# Patient Record
Sex: FEMALE | Race: WHITE | NOT HISPANIC OR LATINO | Employment: FULL TIME | URBAN - METROPOLITAN AREA
[De-identification: names, ages, dates, MRNs, and addresses within clinical notes are randomized per-mention and may not be internally consistent; named-entity substitution may affect disease eponyms.]

---

## 2019-01-01 ENCOUNTER — HOSPITAL ENCOUNTER (INPATIENT)
Facility: HOSPITAL | Age: 59
LOS: 4 days | DRG: 871 | End: 2019-05-31
Attending: EMERGENCY MEDICINE | Admitting: INTERNAL MEDICINE
Payer: COMMERCIAL

## 2019-01-01 VITALS
DIASTOLIC BLOOD PRESSURE: 65 MMHG | WEIGHT: 164.25 LBS | HEIGHT: 69 IN | SYSTOLIC BLOOD PRESSURE: 85 MMHG | BODY MASS INDEX: 24.33 KG/M2 | TEMPERATURE: 100 F

## 2019-01-01 DIAGNOSIS — J18.9 PNEUMONIA OF LEFT UPPER LOBE DUE TO INFECTIOUS ORGANISM: Primary | ICD-10-CM

## 2019-01-01 DIAGNOSIS — R65.20 SEVERE SEPSIS WITH ACUTE ORGAN DYSFUNCTION: ICD-10-CM

## 2019-01-01 DIAGNOSIS — C91.40 HAIRY CELL LEUKEMIA NOT HAVING ACHIEVED REMISSION: ICD-10-CM

## 2019-01-01 DIAGNOSIS — E44.0 MODERATE MALNUTRITION: ICD-10-CM

## 2019-01-01 DIAGNOSIS — A41.9 SEVERE SEPSIS WITH ACUTE ORGAN DYSFUNCTION: ICD-10-CM

## 2019-01-01 DIAGNOSIS — J18.9 SEVERE PNEUMONIA: ICD-10-CM

## 2019-01-01 DIAGNOSIS — R57.9 SHOCK CIRCULATORY: ICD-10-CM

## 2019-01-01 DIAGNOSIS — D70.8 OTHER NEUTROPENIA: Chronic | ICD-10-CM

## 2019-01-01 DIAGNOSIS — R00.0 TACHYCARDIA: ICD-10-CM

## 2019-01-01 DIAGNOSIS — Z85.6 HISTORY OF LEUKEMIA: ICD-10-CM

## 2019-01-01 DIAGNOSIS — D61.818 PANCYTOPENIA: ICD-10-CM

## 2019-01-01 DIAGNOSIS — J96.01 ACUTE HYPOXEMIC RESPIRATORY FAILURE: ICD-10-CM

## 2019-01-01 DIAGNOSIS — A41.9 SEPSIS: ICD-10-CM

## 2019-01-01 DIAGNOSIS — J18.9 PNEUMONIA: ICD-10-CM

## 2019-01-01 LAB
ABO + RH BLD: NORMAL
ABO + RH BLD: NORMAL
ALBUMIN SERPL BCP-MCNC: 3 G/DL (ref 3.5–5.2)
ALLENS TEST: ABNORMAL
ALP SERPL-CCNC: 70 U/L (ref 55–135)
ALT SERPL W/O P-5'-P-CCNC: 14 U/L (ref 10–44)
ANION GAP SERPL CALC-SCNC: 10 MMOL/L (ref 8–16)
ANION GAP SERPL CALC-SCNC: 12 MMOL/L (ref 8–16)
ANION GAP SERPL CALC-SCNC: 6 MMOL/L (ref 8–16)
ANION GAP SERPL CALC-SCNC: 8 MMOL/L (ref 8–16)
ANION GAP SERPL CALC-SCNC: 9 MMOL/L (ref 8–16)
ANISOCYTOSIS BLD QL SMEAR: SLIGHT
APTT BLDCRRT: 23.4 SEC (ref 21–32)
AST SERPL-CCNC: 13 U/L (ref 10–40)
BACTERIA #/AREA URNS HPF: ABNORMAL /HPF
BASOPHILS # BLD AUTO: 0 K/UL (ref 0–0.2)
BASOPHILS # BLD AUTO: 0 K/UL (ref 0–0.2)
BASOPHILS # BLD AUTO: 0.01 K/UL (ref 0–0.2)
BASOPHILS # BLD AUTO: ABNORMAL K/UL (ref 0–0.2)
BASOPHILS # BLD AUTO: ABNORMAL K/UL (ref 0–0.2)
BASOPHILS NFR BLD: 0 % (ref 0–1.9)
BASOPHILS NFR BLD: 1.4 % (ref 0–1.9)
BILIRUB SERPL-MCNC: 1.3 MG/DL (ref 0.1–1)
BILIRUB UR QL STRIP: NEGATIVE
BLD GP AB SCN CELLS X3 SERPL QL: NORMAL
BLD GP AB SCN CELLS X3 SERPL QL: NORMAL
BUN SERPL-MCNC: 10 MG/DL (ref 6–20)
BUN SERPL-MCNC: 10 MG/DL (ref 6–20)
BUN SERPL-MCNC: 12 MG/DL (ref 6–20)
BUN SERPL-MCNC: 14 MG/DL (ref 6–20)
BUN SERPL-MCNC: 18 MG/DL (ref 6–20)
BURR CELLS BLD QL SMEAR: ABNORMAL
BURR CELLS BLD QL SMEAR: ABNORMAL
CALCIUM SERPL-MCNC: 5.2 MG/DL (ref 8.7–10.5)
CALCIUM SERPL-MCNC: 7.6 MG/DL (ref 8.7–10.5)
CALCIUM SERPL-MCNC: 8.2 MG/DL (ref 8.7–10.5)
CALCIUM SERPL-MCNC: 8.3 MG/DL (ref 8.7–10.5)
CALCIUM SERPL-MCNC: 8.8 MG/DL (ref 8.7–10.5)
CHLORIDE SERPL-SCNC: 104 MMOL/L (ref 95–110)
CHLORIDE SERPL-SCNC: 106 MMOL/L (ref 95–110)
CHLORIDE SERPL-SCNC: 106 MMOL/L (ref 95–110)
CHLORIDE SERPL-SCNC: 107 MMOL/L (ref 95–110)
CHLORIDE SERPL-SCNC: 115 MMOL/L (ref 95–110)
CLARITY UR: CLEAR
CO2 SERPL-SCNC: 15 MMOL/L (ref 23–29)
CO2 SERPL-SCNC: 22 MMOL/L (ref 23–29)
CO2 SERPL-SCNC: 22 MMOL/L (ref 23–29)
CO2 SERPL-SCNC: 23 MMOL/L (ref 23–29)
CO2 SERPL-SCNC: 25 MMOL/L (ref 23–29)
COLOR UR: YELLOW
CREAT SERPL-MCNC: 0.8 MG/DL (ref 0.5–1.4)
CREAT SERPL-MCNC: 0.8 MG/DL (ref 0.5–1.4)
CREAT SERPL-MCNC: 0.9 MG/DL (ref 0.5–1.4)
CREAT SERPL-MCNC: 0.9 MG/DL (ref 0.5–1.4)
CREAT SERPL-MCNC: 1.3 MG/DL (ref 0.5–1.4)
DACRYOCYTES BLD QL SMEAR: ABNORMAL
DELSYS: ABNORMAL
DIASTOLIC DYSFUNCTION: NO
DIFFERENTIAL METHOD: ABNORMAL
EOSINOPHIL # BLD AUTO: 0 K/UL (ref 0–0.5)
EOSINOPHIL # BLD AUTO: ABNORMAL K/UL (ref 0–0.5)
EOSINOPHIL # BLD AUTO: ABNORMAL K/UL (ref 0–0.5)
EOSINOPHIL NFR BLD: 0 % (ref 0–8)
ERYTHROCYTE [DISTWIDTH] IN BLOOD BY AUTOMATED COUNT: 16.3 % (ref 11.5–14.5)
ERYTHROCYTE [DISTWIDTH] IN BLOOD BY AUTOMATED COUNT: 16.3 % (ref 11.5–14.5)
ERYTHROCYTE [DISTWIDTH] IN BLOOD BY AUTOMATED COUNT: 16.6 % (ref 11.5–14.5)
ERYTHROCYTE [DISTWIDTH] IN BLOOD BY AUTOMATED COUNT: 16.9 % (ref 11.5–14.5)
ERYTHROCYTE [DISTWIDTH] IN BLOOD BY AUTOMATED COUNT: 17.9 % (ref 11.5–14.5)
ERYTHROCYTE [SEDIMENTATION RATE] IN BLOOD BY WESTERGREN METHOD: 14 MM/H
ERYTHROCYTE [SEDIMENTATION RATE] IN BLOOD BY WESTERGREN METHOD: 14 MM/H
ERYTHROCYTE [SEDIMENTATION RATE] IN BLOOD BY WESTERGREN METHOD: 28 MM/H
ERYTHROCYTE [SEDIMENTATION RATE] IN BLOOD BY WESTERGREN METHOD: 28 MM/H
EST. GFR  (AFRICAN AMERICAN): 53 ML/MIN/1.73 M^2
EST. GFR  (AFRICAN AMERICAN): >60 ML/MIN/1.73 M^2
EST. GFR  (NON AFRICAN AMERICAN): 46 ML/MIN/1.73 M^2
EST. GFR  (NON AFRICAN AMERICAN): >60 ML/MIN/1.73 M^2
ESTIMATED PA SYSTOLIC PRESSURE: 24.16
FIO2: 100
FIO2: 40
FIO2: 65
FIO2: 90
FLOW: 10
FLOW: 35
GLUCOSE SERPL-MCNC: 100 MG/DL (ref 70–110)
GLUCOSE SERPL-MCNC: 104 MG/DL (ref 70–110)
GLUCOSE SERPL-MCNC: 110 MG/DL (ref 70–110)
GLUCOSE SERPL-MCNC: 122 MG/DL (ref 70–110)
GLUCOSE SERPL-MCNC: 154 MG/DL (ref 70–110)
GLUCOSE SERPL-MCNC: 154 MG/DL (ref 70–110)
GLUCOSE SERPL-MCNC: 88 MG/DL (ref 70–110)
GLUCOSE UR QL STRIP: NEGATIVE
HCO3 UR-SCNC: 17.4 MMOL/L (ref 24–28)
HCO3 UR-SCNC: 19.2 MMOL/L (ref 24–28)
HCO3 UR-SCNC: 19.4 MMOL/L (ref 24–28)
HCO3 UR-SCNC: 19.6 MMOL/L (ref 24–28)
HCO3 UR-SCNC: 21.5 MMOL/L (ref 24–28)
HCO3 UR-SCNC: 23.7 MMOL/L (ref 24–28)
HCT VFR BLD AUTO: 26 % (ref 37–48.5)
HCT VFR BLD AUTO: 26.3 % (ref 37–48.5)
HCT VFR BLD AUTO: 26.7 % (ref 37–48.5)
HCT VFR BLD AUTO: 33 % (ref 37–48.5)
HCT VFR BLD AUTO: 34.6 % (ref 37–48.5)
HCT VFR BLD CALC: 30 %PCV (ref 36–54)
HCT VFR BLD CALC: 32 %PCV (ref 36–54)
HGB BLD-MCNC: 10.7 G/DL (ref 12–16)
HGB BLD-MCNC: 10.7 G/DL (ref 12–16)
HGB BLD-MCNC: 8.6 G/DL (ref 12–16)
HGB BLD-MCNC: 8.6 G/DL (ref 12–16)
HGB BLD-MCNC: 8.8 G/DL (ref 12–16)
HGB UR QL STRIP: NEGATIVE
HYALINE CASTS #/AREA URNS LPF: 0 /LPF
HYPOCHROMIA BLD QL SMEAR: ABNORMAL
HYPOCHROMIA BLD QL SMEAR: ABNORMAL
INR PPP: 1 (ref 0.8–1.2)
KETONES UR QL STRIP: ABNORMAL
LACTATE SERPL-SCNC: 0.8 MMOL/L (ref 0.5–2.2)
LACTATE SERPL-SCNC: 0.9 MMOL/L (ref 0.5–2.2)
LACTATE SERPL-SCNC: 0.9 MMOL/L (ref 0.5–2.2)
LACTATE SERPL-SCNC: 2.5 MMOL/L (ref 0.5–2.2)
LACTATE SERPL-SCNC: 2.6 MMOL/L (ref 0.5–2.2)
LEUKOCYTE ESTERASE UR QL STRIP: NEGATIVE
LYMPHOCYTES # BLD AUTO: 0.4 K/UL (ref 1–4.8)
LYMPHOCYTES # BLD AUTO: 0.5 K/UL (ref 1–4.8)
LYMPHOCYTES # BLD AUTO: 0.7 K/UL (ref 1–4.8)
LYMPHOCYTES # BLD AUTO: ABNORMAL K/UL (ref 1–4.8)
LYMPHOCYTES # BLD AUTO: ABNORMAL K/UL (ref 1–4.8)
LYMPHOCYTES NFR BLD: 62 % (ref 18–48)
LYMPHOCYTES NFR BLD: 68.9 % (ref 18–48)
LYMPHOCYTES NFR BLD: 70.4 % (ref 18–48)
LYMPHOCYTES NFR BLD: 73.2 % (ref 18–48)
LYMPHOCYTES NFR BLD: 88 % (ref 18–48)
MAGNESIUM SERPL-MCNC: 1.6 MG/DL (ref 1.6–2.6)
MCH RBC QN AUTO: 29.6 PG (ref 27–31)
MCH RBC QN AUTO: 30.1 PG (ref 27–31)
MCH RBC QN AUTO: 30.4 PG (ref 27–31)
MCHC RBC AUTO-ENTMCNC: 30.9 G/DL (ref 32–36)
MCHC RBC AUTO-ENTMCNC: 32.4 G/DL (ref 32–36)
MCHC RBC AUTO-ENTMCNC: 32.7 G/DL (ref 32–36)
MCHC RBC AUTO-ENTMCNC: 33 G/DL (ref 32–36)
MCHC RBC AUTO-ENTMCNC: 33.1 G/DL (ref 32–36)
MCV RBC AUTO: 92 FL (ref 82–98)
MCV RBC AUTO: 94 FL (ref 82–98)
MCV RBC AUTO: 96 FL (ref 82–98)
MICROSCOPIC COMMENT: ABNORMAL
MIN VOL: 12.4
MODE: ABNORMAL
MONOCYTES # BLD AUTO: 0.1 K/UL (ref 0.3–1)
MONOCYTES # BLD AUTO: ABNORMAL K/UL (ref 0.3–1)
MONOCYTES # BLD AUTO: ABNORMAL K/UL (ref 0.3–1)
MONOCYTES NFR BLD: 11.3 % (ref 4–15)
MONOCYTES NFR BLD: 12 % (ref 4–15)
MONOCYTES NFR BLD: 15 % (ref 4–15)
MONOCYTES NFR BLD: 20.4 % (ref 4–15)
MONOCYTES NFR BLD: 7 % (ref 4–15)
NEUTROPHILS # BLD AUTO: 0.1 K/UL (ref 1.8–7.7)
NEUTROPHILS # BLD AUTO: 0.1 K/UL (ref 1.8–7.7)
NEUTROPHILS # BLD AUTO: 0.2 K/UL (ref 1.8–7.7)
NEUTROPHILS NFR BLD: 0 % (ref 38–73)
NEUTROPHILS NFR BLD: 19.8 % (ref 38–73)
NEUTROPHILS NFR BLD: 21.2 % (ref 38–73)
NEUTROPHILS NFR BLD: 23 % (ref 38–73)
NEUTROPHILS NFR BLD: 9.2 % (ref 38–73)
NITRITE UR QL STRIP: NEGATIVE
OVALOCYTES BLD QL SMEAR: ABNORMAL
PCO2 BLDA: 29.6 MMHG (ref 35–45)
PCO2 BLDA: 33.8 MMHG (ref 35–45)
PCO2 BLDA: 41.9 MMHG (ref 35–45)
PCO2 BLDA: 70.2 MMHG (ref 35–45)
PCO2 BLDA: 70.3 MMHG (ref 35–45)
PCO2 BLDA: 98.9 MMHG (ref 35–45)
PEEP: 12
PEEP: 15
PH SMN: 6.95 [PH] (ref 7.35–7.45)
PH SMN: 7 [PH] (ref 7.35–7.45)
PH SMN: 7.13 [PH] (ref 7.35–7.45)
PH SMN: 7.27 [PH] (ref 7.35–7.45)
PH SMN: 7.37 [PH] (ref 7.35–7.45)
PH SMN: 7.42 [PH] (ref 7.35–7.45)
PH UR STRIP: 7 [PH] (ref 5–8)
PHOSPHATE SERPL-MCNC: 2.3 MG/DL (ref 2.7–4.5)
PLATELET # BLD AUTO: 36 K/UL (ref 150–350)
PLATELET # BLD AUTO: 37 K/UL (ref 150–350)
PLATELET # BLD AUTO: 42 K/UL (ref 150–350)
PLATELET # BLD AUTO: 44 K/UL (ref 150–350)
PLATELET # BLD AUTO: 82 K/UL (ref 150–350)
PLATELET BLD QL SMEAR: ABNORMAL
PMV BLD AUTO: 10.5 FL (ref 9.2–12.9)
PMV BLD AUTO: 10.9 FL (ref 9.2–12.9)
PMV BLD AUTO: 11.1 FL (ref 9.2–12.9)
PMV BLD AUTO: 11.5 FL (ref 9.2–12.9)
PMV BLD AUTO: 11.6 FL (ref 9.2–12.9)
PO2 BLDA: 41 MMHG (ref 80–100)
PO2 BLDA: 45 MMHG (ref 80–100)
PO2 BLDA: 48 MMHG (ref 80–100)
PO2 BLDA: 50 MMHG (ref 80–100)
PO2 BLDA: 60 MMHG (ref 80–100)
PO2 BLDA: 66 MMHG (ref 80–100)
POC BE: -11 MMOL/L
POC BE: -14 MMOL/L
POC BE: -5 MMOL/L
POC BE: -5 MMOL/L
POC BE: -6 MMOL/L
POC BE: -7 MMOL/L
POC IONIZED CALCIUM: 0.92 MMOL/L (ref 1.06–1.42)
POC IONIZED CALCIUM: 0.99 MMOL/L (ref 1.06–1.42)
POC SATURATED O2: 50 % (ref 95–100)
POC SATURATED O2: 56 % (ref 95–100)
POC SATURATED O2: 65 % (ref 95–100)
POC SATURATED O2: 86 % (ref 95–100)
POC SATURATED O2: 87 % (ref 95–100)
POC SATURATED O2: 92 % (ref 95–100)
POCT GLUCOSE: 159 MG/DL (ref 70–110)
POIKILOCYTOSIS BLD QL SMEAR: SLIGHT
POLYCHROMASIA BLD QL SMEAR: ABNORMAL
POTASSIUM BLD-SCNC: 4 MMOL/L (ref 3.5–5.1)
POTASSIUM BLD-SCNC: 4.3 MMOL/L (ref 3.5–5.1)
POTASSIUM SERPL-SCNC: 3.3 MMOL/L (ref 3.5–5.1)
POTASSIUM SERPL-SCNC: 3.4 MMOL/L (ref 3.5–5.1)
POTASSIUM SERPL-SCNC: 3.4 MMOL/L (ref 3.5–5.1)
POTASSIUM SERPL-SCNC: 3.5 MMOL/L (ref 3.5–5.1)
POTASSIUM SERPL-SCNC: 4.1 MMOL/L (ref 3.5–5.1)
PROCALCITONIN SERPL IA-MCNC: 0.23 NG/ML
PROT SERPL-MCNC: 6.3 G/DL (ref 6–8.4)
PROT UR QL STRIP: ABNORMAL
PROTHROMBIN TIME: 11.2 SEC (ref 9–12.5)
RBC # BLD AUTO: 2.83 M/UL (ref 4–5.4)
RBC # BLD AUTO: 2.86 M/UL (ref 4–5.4)
RBC # BLD AUTO: 2.89 M/UL (ref 4–5.4)
RBC # BLD AUTO: 3.52 M/UL (ref 4–5.4)
RBC # BLD AUTO: 3.62 M/UL (ref 4–5.4)
RBC #/AREA URNS HPF: 0 /HPF (ref 0–4)
RETIRED EF AND QEF - SEE NOTES: 55 (ref 55–65)
SAMPLE: ABNORMAL
SCHISTOCYTES BLD QL SMEAR: PRESENT
SCHISTOCYTES BLD QL SMEAR: PRESENT
SITE: ABNORMAL
SODIUM BLD-SCNC: 133 MMOL/L (ref 136–145)
SODIUM BLD-SCNC: 135 MMOL/L (ref 136–145)
SODIUM SERPL-SCNC: 137 MMOL/L (ref 136–145)
SODIUM SERPL-SCNC: 138 MMOL/L (ref 136–145)
SODIUM SERPL-SCNC: 139 MMOL/L (ref 136–145)
SP GR UR STRIP: 1.01 (ref 1–1.03)
SPHEROCYTES BLD QL SMEAR: ABNORMAL
SQUAMOUS #/AREA URNS HPF: 10 /HPF
STOMATOCYTES BLD QL SMEAR: PRESENT
STOMATOCYTES BLD QL SMEAR: PRESENT
TROPONIN I SERPL DL<=0.01 NG/ML-MCNC: 0.01 NG/ML (ref 0–0.03)
TROPONIN I SERPL DL<=0.01 NG/ML-MCNC: <0.006 NG/ML (ref 0–0.03)
URN SPEC COLLECT METH UR: ABNORMAL
UROBILINOGEN UR STRIP-ACNC: ABNORMAL EU/DL
VANCOMYCIN SERPL-MCNC: 15.2 UG/ML
VANCOMYCIN TROUGH SERPL-MCNC: 13.3 UG/ML (ref 10–22)
VANCOMYCIN TROUGH SERPL-MCNC: 22.6 UG/ML (ref 10–22)
VT: 400
WBC # BLD AUTO: 0.54 K/UL (ref 3.9–12.7)
WBC # BLD AUTO: 0.71 K/UL (ref 3.9–12.7)
WBC # BLD AUTO: 1.06 K/UL (ref 3.9–12.7)
WBC # BLD AUTO: 1.15 K/UL (ref 3.9–12.7)
WBC # BLD AUTO: 11.91 K/UL (ref 3.9–12.7)
WBC #/AREA URNS HPF: 1 /HPF (ref 0–5)
YEAST URNS QL MICRO: ABNORMAL

## 2019-01-01 PROCEDURE — 36415 COLL VENOUS BLD VENIPUNCTURE: CPT

## 2019-01-01 PROCEDURE — 99233 PR SUBSEQUENT HOSPITAL CARE,LEVL III: ICD-10-PCS | Mod: ,,, | Performed by: INTERNAL MEDICINE

## 2019-01-01 PROCEDURE — 21400001 HC TELEMETRY ROOM

## 2019-01-01 PROCEDURE — 84484 ASSAY OF TROPONIN QUANT: CPT

## 2019-01-01 PROCEDURE — 93306 TTE W/DOPPLER COMPLETE: CPT | Mod: 26,,, | Performed by: INTERNAL MEDICINE

## 2019-01-01 PROCEDURE — 94664 DEMO&/EVAL PT USE INHALER: CPT

## 2019-01-01 PROCEDURE — 94640 AIRWAY INHALATION TREATMENT: CPT

## 2019-01-01 PROCEDURE — 87040 BLOOD CULTURE FOR BACTERIA: CPT

## 2019-01-01 PROCEDURE — 63600175 PHARM REV CODE 636 W HCPCS: Performed by: INTERNAL MEDICINE

## 2019-01-01 PROCEDURE — 85014 HEMATOCRIT: CPT

## 2019-01-01 PROCEDURE — 80048 BASIC METABOLIC PNL TOTAL CA: CPT

## 2019-01-01 PROCEDURE — 96361 HYDRATE IV INFUSION ADD-ON: CPT

## 2019-01-01 PROCEDURE — 25000242 PHARM REV CODE 250 ALT 637 W/ HCPCS: Performed by: NURSE PRACTITIONER

## 2019-01-01 PROCEDURE — 84100 ASSAY OF PHOSPHORUS: CPT

## 2019-01-01 PROCEDURE — S0028 INJECTION, FAMOTIDINE, 20 MG: HCPCS | Performed by: INTERNAL MEDICINE

## 2019-01-01 PROCEDURE — 25000003 PHARM REV CODE 250: Performed by: NURSE PRACTITIONER

## 2019-01-01 PROCEDURE — 99291 PR CRITICAL CARE, E/M 30-74 MINUTES: ICD-10-PCS | Mod: ,,, | Performed by: INTERNAL MEDICINE

## 2019-01-01 PROCEDURE — 63600175 PHARM REV CODE 636 W HCPCS: Performed by: HOSPITALIST

## 2019-01-01 PROCEDURE — 82330 ASSAY OF CALCIUM: CPT

## 2019-01-01 PROCEDURE — 84132 ASSAY OF SERUM POTASSIUM: CPT

## 2019-01-01 PROCEDURE — 99900035 HC TECH TIME PER 15 MIN (STAT)

## 2019-01-01 PROCEDURE — 63600175 PHARM REV CODE 636 W HCPCS: Performed by: EMERGENCY MEDICINE

## 2019-01-01 PROCEDURE — 25000003 PHARM REV CODE 250: Performed by: EMERGENCY MEDICINE

## 2019-01-01 PROCEDURE — 82803 BLOOD GASES ANY COMBINATION: CPT

## 2019-01-01 PROCEDURE — 99292 PR CRITICAL CARE, ADDL 30 MIN: ICD-10-PCS | Mod: ,,, | Performed by: INTERNAL MEDICINE

## 2019-01-01 PROCEDURE — 99223 1ST HOSP IP/OBS HIGH 75: CPT | Mod: ,,, | Performed by: INTERNAL MEDICINE

## 2019-01-01 PROCEDURE — 36620 INSERTION CATHETER ARTERY: CPT | Mod: 59,,, | Performed by: INTERNAL MEDICINE

## 2019-01-01 PROCEDURE — 82800 BLOOD PH: CPT

## 2019-01-01 PROCEDURE — 85007 BL SMEAR W/DIFF WBC COUNT: CPT

## 2019-01-01 PROCEDURE — 93306 TTE W/DOPPLER COMPLETE: CPT

## 2019-01-01 PROCEDURE — 94761 N-INVAS EAR/PLS OXIMETRY MLT: CPT

## 2019-01-01 PROCEDURE — 27000221 HC OXYGEN, UP TO 24 HOURS

## 2019-01-01 PROCEDURE — 81000 URINALYSIS NONAUTO W/SCOPE: CPT

## 2019-01-01 PROCEDURE — 63600175 PHARM REV CODE 636 W HCPCS

## 2019-01-01 PROCEDURE — 96366 THER/PROPH/DIAG IV INF ADDON: CPT

## 2019-01-01 PROCEDURE — 99233 SBSQ HOSP IP/OBS HIGH 50: CPT | Mod: ,,, | Performed by: INTERNAL MEDICINE

## 2019-01-01 PROCEDURE — 85025 COMPLETE CBC W/AUTO DIFF WBC: CPT

## 2019-01-01 PROCEDURE — A4216 STERILE WATER/SALINE, 10 ML: HCPCS | Performed by: EMERGENCY MEDICINE

## 2019-01-01 PROCEDURE — 93010 EKG 12-LEAD: ICD-10-PCS | Mod: ,,, | Performed by: INTERNAL MEDICINE

## 2019-01-01 PROCEDURE — 97802 MEDICAL NUTRITION INDIV IN: CPT

## 2019-01-01 PROCEDURE — 25000003 PHARM REV CODE 250: Performed by: HOSPITALIST

## 2019-01-01 PROCEDURE — 80202 ASSAY OF VANCOMYCIN: CPT

## 2019-01-01 PROCEDURE — 37799 UNLISTED PX VASCULAR SURGERY: CPT

## 2019-01-01 PROCEDURE — 83605 ASSAY OF LACTIC ACID: CPT | Mod: 91

## 2019-01-01 PROCEDURE — 99232 SBSQ HOSP IP/OBS MODERATE 35: CPT | Mod: ,,, | Performed by: INTERNAL MEDICINE

## 2019-01-01 PROCEDURE — 93041 RHYTHM ECG TRACING: CPT

## 2019-01-01 PROCEDURE — 80053 COMPREHEN METABOLIC PANEL: CPT

## 2019-01-01 PROCEDURE — 93010 ELECTROCARDIOGRAM REPORT: CPT | Mod: ,,, | Performed by: INTERNAL MEDICINE

## 2019-01-01 PROCEDURE — 51702 INSERT TEMP BLADDER CATH: CPT

## 2019-01-01 PROCEDURE — 25000003 PHARM REV CODE 250: Performed by: INTERNAL MEDICINE

## 2019-01-01 PROCEDURE — 99291 PR CRITICAL CARE, E/M 30-74 MINUTES: ICD-10-PCS | Mod: 25,,, | Performed by: INTERNAL MEDICINE

## 2019-01-01 PROCEDURE — 25000003 PHARM REV CODE 250: Performed by: CLINIC/CENTER

## 2019-01-01 PROCEDURE — 84145 PROCALCITONIN (PCT): CPT

## 2019-01-01 PROCEDURE — 31500 INTUBATION: ICD-10-PCS | Mod: ,,, | Performed by: INTERNAL MEDICINE

## 2019-01-01 PROCEDURE — 99291 CRITICAL CARE FIRST HOUR: CPT | Mod: 25,,, | Performed by: INTERNAL MEDICINE

## 2019-01-01 PROCEDURE — 27000646 HC AEROBIKA DEVICE

## 2019-01-01 PROCEDURE — 99900026 HC AIRWAY MAINTENANCE (STAT)

## 2019-01-01 PROCEDURE — 93005 ELECTROCARDIOGRAM TRACING: CPT

## 2019-01-01 PROCEDURE — 63600175 PHARM REV CODE 636 W HCPCS: Mod: JG | Performed by: INTERNAL MEDICINE

## 2019-01-01 PROCEDURE — 85730 THROMBOPLASTIN TIME PARTIAL: CPT

## 2019-01-01 PROCEDURE — 83605 ASSAY OF LACTIC ACID: CPT

## 2019-01-01 PROCEDURE — 25000003 PHARM REV CODE 250

## 2019-01-01 PROCEDURE — 36556 INSERT NON-TUNNEL CV CATH: CPT

## 2019-01-01 PROCEDURE — 87449 NOS EACH ORGANISM AG IA: CPT

## 2019-01-01 PROCEDURE — 99232 PR SUBSEQUENT HOSPITAL CARE,LEVL II: ICD-10-PCS | Mod: ,,, | Performed by: INTERNAL MEDICINE

## 2019-01-01 PROCEDURE — 87070 CULTURE OTHR SPECIMN AEROBIC: CPT

## 2019-01-01 PROCEDURE — 96367 TX/PROPH/DG ADDL SEQ IV INF: CPT

## 2019-01-01 PROCEDURE — 93306 2D ECHO WITH COLOR FLOW DOPPLER: ICD-10-PCS | Mod: 26,,, | Performed by: INTERNAL MEDICINE

## 2019-01-01 PROCEDURE — 94002 VENT MGMT INPAT INIT DAY: CPT

## 2019-01-01 PROCEDURE — 86850 RBC ANTIBODY SCREEN: CPT

## 2019-01-01 PROCEDURE — 87205 SMEAR GRAM STAIN: CPT

## 2019-01-01 PROCEDURE — 36620 INSERTION CATHETER ARTERY: CPT

## 2019-01-01 PROCEDURE — 99292 CRITICAL CARE ADDL 30 MIN: CPT | Mod: 25,,, | Performed by: INTERNAL MEDICINE

## 2019-01-01 PROCEDURE — 96365 THER/PROPH/DIAG IV INF INIT: CPT

## 2019-01-01 PROCEDURE — 86901 BLOOD TYPING SEROLOGIC RH(D): CPT

## 2019-01-01 PROCEDURE — 85027 COMPLETE CBC AUTOMATED: CPT

## 2019-01-01 PROCEDURE — 85610 PROTHROMBIN TIME: CPT

## 2019-01-01 PROCEDURE — 83735 ASSAY OF MAGNESIUM: CPT

## 2019-01-01 PROCEDURE — 36600 WITHDRAWAL OF ARTERIAL BLOOD: CPT

## 2019-01-01 PROCEDURE — 99291 CRITICAL CARE FIRST HOUR: CPT | Mod: 25

## 2019-01-01 PROCEDURE — 31500 INSERT EMERGENCY AIRWAY: CPT | Mod: ,,, | Performed by: INTERNAL MEDICINE

## 2019-01-01 PROCEDURE — 20000000 HC ICU ROOM

## 2019-01-01 PROCEDURE — 36620 PR INSERT CATH,ART,PERCUT,SHORTTERM: ICD-10-PCS | Mod: 59,,, | Performed by: INTERNAL MEDICINE

## 2019-01-01 PROCEDURE — 99291 CRITICAL CARE FIRST HOUR: CPT | Mod: ,,, | Performed by: INTERNAL MEDICINE

## 2019-01-01 PROCEDURE — 63600175 PHARM REV CODE 636 W HCPCS: Performed by: NURSE PRACTITIONER

## 2019-01-01 PROCEDURE — 99292 PR CRITICAL CARE, ADDL 30 MIN: ICD-10-PCS | Mod: 25,,, | Performed by: INTERNAL MEDICINE

## 2019-01-01 PROCEDURE — 63600175 PHARM REV CODE 636 W HCPCS: Performed by: CLINIC/CENTER

## 2019-01-01 PROCEDURE — S0077 INJECTION, CLINDAMYCIN PHOSP: HCPCS | Performed by: INTERNAL MEDICINE

## 2019-01-01 PROCEDURE — 94003 VENT MGMT INPAT SUBQ DAY: CPT

## 2019-01-01 PROCEDURE — 99223 PR INITIAL HOSPITAL CARE,LEVL III: ICD-10-PCS | Mod: ,,, | Performed by: INTERNAL MEDICINE

## 2019-01-01 PROCEDURE — 99292 CRITICAL CARE ADDL 30 MIN: CPT | Mod: ,,, | Performed by: INTERNAL MEDICINE

## 2019-01-01 PROCEDURE — 84295 ASSAY OF SERUM SODIUM: CPT

## 2019-01-01 RX ORDER — PROMETHAZINE HYDROCHLORIDE AND CODEINE PHOSPHATE 6.25; 1 MG/5ML; MG/5ML
5 SOLUTION ORAL EVERY 4 HOURS PRN
Status: DISCONTINUED | OUTPATIENT
Start: 2019-01-01 | End: 2019-01-01 | Stop reason: HOSPADM

## 2019-01-01 RX ORDER — KETOROLAC TROMETHAMINE 30 MG/ML
30 INJECTION, SOLUTION INTRAMUSCULAR; INTRAVENOUS ONCE
Status: COMPLETED | OUTPATIENT
Start: 2019-01-01 | End: 2019-01-01

## 2019-01-01 RX ORDER — SODIUM CHLORIDE 9 MG/ML
INJECTION, SOLUTION INTRAVENOUS CONTINUOUS
Status: DISCONTINUED | OUTPATIENT
Start: 2019-01-01 | End: 2019-01-01

## 2019-01-01 RX ORDER — VANCOMYCIN HCL IN 5 % DEXTROSE 1G/250ML
1000 PLASTIC BAG, INJECTION (ML) INTRAVENOUS
Status: DISCONTINUED | OUTPATIENT
Start: 2019-01-01 | End: 2019-01-01

## 2019-01-01 RX ORDER — BENZONATATE 100 MG/1
100 CAPSULE ORAL 3 TIMES DAILY PRN
Status: DISCONTINUED | OUTPATIENT
Start: 2019-01-01 | End: 2019-01-01

## 2019-01-01 RX ORDER — ACETAMINOPHEN 325 MG/1
650 TABLET ORAL EVERY 6 HOURS PRN
Status: DISCONTINUED | OUTPATIENT
Start: 2019-01-01 | End: 2019-01-01 | Stop reason: HOSPADM

## 2019-01-01 RX ORDER — MEROPENEM AND SODIUM CHLORIDE 500 MG/50ML
500 INJECTION, SOLUTION INTRAVENOUS
Status: DISCONTINUED | OUTPATIENT
Start: 2019-01-01 | End: 2019-01-01 | Stop reason: HOSPADM

## 2019-01-01 RX ORDER — PROPOFOL 10 MG/ML
5 INJECTION, EMULSION INTRAVENOUS CONTINUOUS
Status: DISCONTINUED | OUTPATIENT
Start: 2019-01-01 | End: 2019-01-01 | Stop reason: HOSPADM

## 2019-01-01 RX ORDER — SODIUM CHLORIDE 0.9 % (FLUSH) 0.9 %
10 SYRINGE (ML) INJECTION EVERY 6 HOURS
Status: DISCONTINUED | OUTPATIENT
Start: 2019-01-01 | End: 2019-01-01

## 2019-01-01 RX ORDER — INDOMETHACIN 25 MG/1
100 CAPSULE ORAL ONCE
Status: COMPLETED | OUTPATIENT
Start: 2019-01-01 | End: 2019-01-01

## 2019-01-01 RX ORDER — CALCIUM GLUCONATE 98 MG/ML
1 INJECTION, SOLUTION INTRAVENOUS ONCE
Status: COMPLETED | OUTPATIENT
Start: 2019-01-01 | End: 2019-01-01

## 2019-01-01 RX ORDER — CLINDAMYCIN PHOSPHATE 900 MG/50ML
900 INJECTION, SOLUTION INTRAVENOUS
Status: DISCONTINUED | OUTPATIENT
Start: 2019-01-01 | End: 2019-01-01 | Stop reason: HOSPADM

## 2019-01-01 RX ORDER — SODIUM CHLORIDE 0.9 % (FLUSH) 0.9 %
10 SYRINGE (ML) INJECTION
Status: DISCONTINUED | OUTPATIENT
Start: 2019-01-01 | End: 2019-01-01

## 2019-01-01 RX ORDER — FENTANYL CITRAT/DEXTROSE 5%/PF 100 MCG/10
PATIENT CONTROLLED ANALGESIA SYRINGE INTRAVENOUS CONTINUOUS
Status: DISCONTINUED | OUTPATIENT
Start: 2019-01-01 | End: 2019-01-01 | Stop reason: HOSPADM

## 2019-01-01 RX ORDER — POTASSIUM CHLORIDE 29.8 MG/ML
40 INJECTION INTRAVENOUS ONCE
Status: COMPLETED | OUTPATIENT
Start: 2019-01-01 | End: 2019-01-01

## 2019-01-01 RX ORDER — IPRATROPIUM BROMIDE AND ALBUTEROL SULFATE 2.5; .5 MG/3ML; MG/3ML
3 SOLUTION RESPIRATORY (INHALATION) EVERY 4 HOURS PRN
Status: DISCONTINUED | OUTPATIENT
Start: 2019-01-01 | End: 2019-01-01 | Stop reason: HOSPADM

## 2019-01-01 RX ORDER — KETOROLAC TROMETHAMINE 30 MG/ML
30 INJECTION, SOLUTION INTRAMUSCULAR; INTRAVENOUS EVERY 6 HOURS PRN
Status: DISCONTINUED | OUTPATIENT
Start: 2019-01-01 | End: 2019-01-01 | Stop reason: HOSPADM

## 2019-01-01 RX ORDER — EPINEPHRINE 0.1 MG/ML
INJECTION INTRAVENOUS CODE/TRAUMA/SEDATION MEDICATION
Status: COMPLETED | OUTPATIENT
Start: 2019-01-01 | End: 2019-01-01

## 2019-01-01 RX ORDER — ONDANSETRON 4 MG/1
4 TABLET, FILM COATED ORAL EVERY 6 HOURS PRN
Status: DISCONTINUED | OUTPATIENT
Start: 2019-01-01 | End: 2019-01-01 | Stop reason: HOSPADM

## 2019-01-01 RX ORDER — MAGNESIUM SULFATE HEPTAHYDRATE 40 MG/ML
2 INJECTION, SOLUTION INTRAVENOUS ONCE
Status: COMPLETED | OUTPATIENT
Start: 2019-01-01 | End: 2019-01-01

## 2019-01-01 RX ORDER — NOREPINEPHRINE BITARTRATE/D5W 4MG/250ML
0.02 PLASTIC BAG, INJECTION (ML) INTRAVENOUS CONTINUOUS
Status: DISCONTINUED | OUTPATIENT
Start: 2019-01-01 | End: 2019-01-01

## 2019-01-01 RX ORDER — CHLORHEXIDINE GLUCONATE ORAL RINSE 1.2 MG/ML
15 SOLUTION DENTAL 2 TIMES DAILY
Status: DISCONTINUED | OUTPATIENT
Start: 2019-01-01 | End: 2019-01-01 | Stop reason: HOSPADM

## 2019-01-01 RX ORDER — LORAZEPAM 2 MG/ML
INJECTION INTRAMUSCULAR
Status: COMPLETED
Start: 2019-01-01 | End: 2019-01-01

## 2019-01-01 RX ORDER — METOPROLOL TARTRATE 1 MG/ML
5 INJECTION, SOLUTION INTRAVENOUS ONCE
Status: DISCONTINUED | OUTPATIENT
Start: 2019-01-01 | End: 2019-01-01

## 2019-01-01 RX ORDER — SODIUM CHLORIDE 0.9 % (FLUSH) 0.9 %
10 SYRINGE (ML) INJECTION
Status: DISCONTINUED | OUTPATIENT
Start: 2019-01-01 | End: 2019-01-01 | Stop reason: HOSPADM

## 2019-01-01 RX ORDER — PHENYLEPHRINE HCL IN 0.9% NACL 20MG/250ML
0.5 PLASTIC BAG, INJECTION (ML) INTRAVENOUS CONTINUOUS
Status: DISCONTINUED | OUTPATIENT
Start: 2019-01-01 | End: 2019-01-01

## 2019-01-01 RX ORDER — PROPOFOL 10 MG/ML
INJECTION, EMULSION INTRAVENOUS
Status: COMPLETED
Start: 2019-01-01 | End: 2019-01-01

## 2019-01-01 RX ORDER — NOREPINEPHRINE BITARTRATE/D5W 4MG/250ML
PLASTIC BAG, INJECTION (ML) INTRAVENOUS
Status: COMPLETED
Start: 2019-01-01 | End: 2019-01-01

## 2019-01-01 RX ORDER — VANCOMYCIN HCL IN 5 % DEXTROSE 1G/250ML
1000 PLASTIC BAG, INJECTION (ML) INTRAVENOUS
Status: DISCONTINUED | OUTPATIENT
Start: 2019-01-01 | End: 2019-01-01 | Stop reason: HOSPADM

## 2019-01-01 RX ORDER — ONDANSETRON 4 MG/1
4 TABLET, FILM COATED ORAL ONCE
Status: DISCONTINUED | OUTPATIENT
Start: 2019-01-01 | End: 2019-01-01

## 2019-01-01 RX ORDER — FAMOTIDINE 10 MG/ML
20 INJECTION INTRAVENOUS 2 TIMES DAILY
Status: DISCONTINUED | OUTPATIENT
Start: 2019-01-01 | End: 2019-01-01 | Stop reason: HOSPADM

## 2019-01-01 RX ADMIN — EPINEPHRINE 1 MG: 0.1 INJECTION, SOLUTION ENDOTRACHEAL; INTRACARDIAC; INTRAVENOUS at 02:05

## 2019-01-01 RX ADMIN — CHLORHEXIDINE GLUCONATE 0.12% ORAL RINSE 15 ML: 1.2 LIQUID ORAL at 09:05

## 2019-01-01 RX ADMIN — PROPOFOL 45 MCG/KG/MIN: 10 INJECTION, EMULSION INTRAVENOUS at 03:05

## 2019-01-01 RX ADMIN — SODIUM CHLORIDE 1000 ML: 0.9 INJECTION, SOLUTION INTRAVENOUS at 12:05

## 2019-01-01 RX ADMIN — VANCOMYCIN HYDROCHLORIDE 1000 MG: 1 INJECTION, POWDER, FOR SOLUTION INTRAVENOUS at 12:05

## 2019-01-01 RX ADMIN — MEROPENEM AND SODIUM CHLORIDE 500 MG: 500 INJECTION, SOLUTION INTRAVENOUS at 10:05

## 2019-01-01 RX ADMIN — ACETAMINOPHEN 650 MG: 325 TABLET ORAL at 12:05

## 2019-01-01 RX ADMIN — VASOPRESSIN 0.04 UNITS/MIN: 20 INJECTION INTRAVENOUS at 09:05

## 2019-01-01 RX ADMIN — Medication 0.1 MCG/KG/MIN: at 03:05

## 2019-01-01 RX ADMIN — IPRATROPIUM BROMIDE AND ALBUTEROL SULFATE 3 ML: .5; 3 SOLUTION RESPIRATORY (INHALATION) at 07:05

## 2019-01-01 RX ADMIN — MEROPENEM AND SODIUM CHLORIDE 500 MG: 500 INJECTION, SOLUTION INTRAVENOUS at 11:05

## 2019-01-01 RX ADMIN — ACETAMINOPHEN 650 MG: 325 TABLET ORAL at 06:05

## 2019-01-01 RX ADMIN — DEXTROSE 300 MG: 50 INJECTION, SOLUTION INTRAVENOUS at 07:05

## 2019-01-01 RX ADMIN — CALCIUM GLUCONATE 1 G: 98 INJECTION, SOLUTION INTRAVENOUS at 06:05

## 2019-01-01 RX ADMIN — PROPOFOL 35 MCG/KG/MIN: 10 INJECTION, EMULSION INTRAVENOUS at 05:05

## 2019-01-01 RX ADMIN — SODIUM BICARBONATE: 84 INJECTION, SOLUTION INTRAVENOUS at 01:05

## 2019-01-01 RX ADMIN — VANCOMYCIN HYDROCHLORIDE 1000 MG: 1 INJECTION, POWDER, FOR SOLUTION INTRAVENOUS at 05:05

## 2019-01-01 RX ADMIN — NOREPINEPHRINE BITARTRATE 0.82 MCG/KG/MIN: 1 INJECTION, SOLUTION, CONCENTRATE INTRAVENOUS at 07:05

## 2019-01-01 RX ADMIN — MEROPENEM AND SODIUM CHLORIDE 500 MG: 500 INJECTION, SOLUTION INTRAVENOUS at 05:05

## 2019-01-01 RX ADMIN — PROMETHAZINE HYDROCHLORIDE AND CODEINE PHOSPHATE 5 ML: 10; 6.25 SOLUTION ORAL at 12:05

## 2019-01-01 RX ADMIN — VASOPRESSIN 0.04 UNITS/MIN: 20 INJECTION INTRAVENOUS at 05:05

## 2019-01-01 RX ADMIN — VANCOMYCIN HYDROCHLORIDE 1250 MG: 100 INJECTION, POWDER, LYOPHILIZED, FOR SOLUTION INTRAVENOUS at 07:05

## 2019-01-01 RX ADMIN — DEXTROSE 450 MG: 50 INJECTION, SOLUTION INTRAVENOUS at 07:05

## 2019-01-01 RX ADMIN — KETOROLAC TROMETHAMINE 30 MG: 30 INJECTION, SOLUTION INTRAMUSCULAR; INTRAVENOUS at 06:05

## 2019-01-01 RX ADMIN — SODIUM CHLORIDE 500 ML: 0.9 INJECTION, SOLUTION INTRAVENOUS at 04:05

## 2019-01-01 RX ADMIN — PROPOFOL 25 MCG/KG/MIN: 10 INJECTION, EMULSION INTRAVENOUS at 12:05

## 2019-01-01 RX ADMIN — TBO-FILGRASTIM 480 MCG: 480 INJECTION, SOLUTION SUBCUTANEOUS at 08:05

## 2019-01-01 RX ADMIN — BENZONATATE 100 MG: 100 CAPSULE ORAL at 02:05

## 2019-01-01 RX ADMIN — Medication 100 MCG/HR: at 04:05

## 2019-01-01 RX ADMIN — FAMOTIDINE 20 MG: 10 INJECTION INTRAVENOUS at 09:05

## 2019-01-01 RX ADMIN — PHENYLEPHRINE HYDROCHLORIDE 5 MCG/KG/MIN: 10 INJECTION INTRAVENOUS at 01:05

## 2019-01-01 RX ADMIN — ACETAMINOPHEN 650 MG: 325 TABLET ORAL at 04:05

## 2019-01-01 RX ADMIN — CLINDAMYCIN IN 5 PERCENT DEXTROSE 900 MG: 18 INJECTION, SOLUTION INTRAVENOUS at 11:05

## 2019-01-01 RX ADMIN — PROMETHAZINE HYDROCHLORIDE AND CODEINE PHOSPHATE 5 ML: 10; 6.25 SOLUTION ORAL at 11:05

## 2019-01-01 RX ADMIN — Medication 10 MCG/HR: at 02:05

## 2019-01-01 RX ADMIN — KETOROLAC TROMETHAMINE 30 MG: 30 INJECTION, SOLUTION INTRAMUSCULAR; INTRAVENOUS at 11:05

## 2019-01-01 RX ADMIN — NOREPINEPHRINE BITARTRATE 0.44 MCG/KG/MIN: 1 INJECTION, SOLUTION, CONCENTRATE INTRAVENOUS at 10:05

## 2019-01-01 RX ADMIN — PHENYLEPHRINE HYDROCHLORIDE 5 MCG/KG/MIN: 10 INJECTION INTRAVENOUS at 12:05

## 2019-01-01 RX ADMIN — GUAIFENESIN AND DEXTROMETHORPHAN HYDROBROMIDE 1 TABLET: 600; 30 TABLET, EXTENDED RELEASE ORAL at 09:05

## 2019-01-01 RX ADMIN — VANCOMYCIN HYDROCHLORIDE 1000 MG: 1 INJECTION, POWDER, FOR SOLUTION INTRAVENOUS at 04:05

## 2019-01-01 RX ADMIN — KETOROLAC TROMETHAMINE 30 MG: 30 INJECTION, SOLUTION INTRAMUSCULAR at 11:05

## 2019-01-01 RX ADMIN — PIPERACILLIN AND TAZOBACTAM 4.5 G: 4; .5 INJECTION, POWDER, LYOPHILIZED, FOR SOLUTION INTRAVENOUS; PARENTERAL at 10:05

## 2019-01-01 RX ADMIN — SODIUM BICARBONATE 100 MEQ: 84 INJECTION, SOLUTION INTRAVENOUS at 05:05

## 2019-01-01 RX ADMIN — SODIUM CHLORIDE 1000 ML: 0.9 INJECTION, SOLUTION INTRAVENOUS at 02:05

## 2019-01-01 RX ADMIN — PIPERACILLIN AND TAZOBACTAM 4.5 G: 4; .5 INJECTION, POWDER, LYOPHILIZED, FOR SOLUTION INTRAVENOUS; PARENTERAL at 11:05

## 2019-01-01 RX ADMIN — PIPERACILLIN AND TAZOBACTAM 4.5 G: 4; .5 INJECTION, POWDER, LYOPHILIZED, FOR SOLUTION INTRAVENOUS; PARENTERAL at 06:05

## 2019-01-01 RX ADMIN — SODIUM CHLORIDE 1000 ML: 0.9 INJECTION, SOLUTION INTRAVENOUS at 09:05

## 2019-01-01 RX ADMIN — PIPERACILLIN AND TAZOBACTAM 4.5 G: 4; .5 INJECTION, POWDER, LYOPHILIZED, FOR SOLUTION INTRAVENOUS; PARENTERAL at 01:05

## 2019-01-01 RX ADMIN — GUAIFENESIN AND DEXTROMETHORPHAN HYDROBROMIDE 1 TABLET: 600; 30 TABLET, EXTENDED RELEASE ORAL at 08:05

## 2019-01-01 RX ADMIN — VASOPRESSIN 0.04 UNITS/MIN: 20 INJECTION INTRAVENOUS at 11:05

## 2019-01-01 RX ADMIN — SODIUM CHLORIDE: 0.9 INJECTION, SOLUTION INTRAVENOUS at 09:05

## 2019-01-01 RX ADMIN — PIPERACILLIN AND TAZOBACTAM 4.5 G: 4; .5 INJECTION, POWDER, LYOPHILIZED, FOR SOLUTION INTRAVENOUS; PARENTERAL at 03:05

## 2019-01-01 RX ADMIN — Medication 0.38 MCG/KG/MIN: at 07:05

## 2019-01-01 RX ADMIN — SODIUM BICARBONATE: 84 INJECTION, SOLUTION INTRAVENOUS at 05:05

## 2019-01-01 RX ADMIN — MAGNESIUM SULFATE 2 G: 2 INJECTION INTRAVENOUS at 10:05

## 2019-01-01 RX ADMIN — POTASSIUM CHLORIDE 40 MEQ: 29.8 INJECTION, SOLUTION INTRAVENOUS at 01:05

## 2019-01-01 RX ADMIN — DEXTROSE 300 MG: 50 INJECTION, SOLUTION INTRAVENOUS at 09:05

## 2019-01-01 RX ADMIN — SODIUM CHLORIDE: 0.9 INJECTION, SOLUTION INTRAVENOUS at 05:05

## 2019-01-01 RX ADMIN — SODIUM CHLORIDE: 0.9 INJECTION, SOLUTION INTRAVENOUS at 10:05

## 2019-01-01 RX ADMIN — GUAIFENESIN AND DEXTROMETHORPHAN HYDROBROMIDE 1 TABLET: 600; 30 TABLET, EXTENDED RELEASE ORAL at 11:05

## 2019-01-01 RX ADMIN — FAMOTIDINE 20 MG: 10 INJECTION INTRAVENOUS at 08:05

## 2019-01-01 RX ADMIN — MEROPENEM AND SODIUM CHLORIDE 500 MG: 500 INJECTION, SOLUTION INTRAVENOUS at 04:05

## 2019-01-01 RX ADMIN — CISATRACURIUM BESYLATE 1 MCG/KG/MIN: 10 INJECTION INTRAVENOUS at 01:05

## 2019-01-01 RX ADMIN — CALCIUM GLUCONATE 1000 MG: 98 INJECTION, SOLUTION INTRAVENOUS at 01:05

## 2019-01-01 RX ADMIN — NOREPINEPHRINE BITARTRATE 2 MCG/KG/MIN: 1 INJECTION, SOLUTION, CONCENTRATE INTRAVENOUS at 01:05

## 2019-01-01 RX ADMIN — PROMETHAZINE HYDROCHLORIDE AND CODEINE PHOSPHATE 5 ML: 10; 6.25 SOLUTION ORAL at 03:05

## 2019-01-01 RX ADMIN — IPRATROPIUM BROMIDE AND ALBUTEROL SULFATE 3 ML: .5; 3 SOLUTION RESPIRATORY (INHALATION) at 12:05

## 2019-01-01 RX ADMIN — Medication 0.42 MCG/KG/MIN: at 09:05

## 2019-01-01 RX ADMIN — CHLORHEXIDINE GLUCONATE 0.12% ORAL RINSE 15 ML: 1.2 LIQUID ORAL at 08:05

## 2019-01-01 RX ADMIN — LORAZEPAM 2 MG: 2 INJECTION INTRAMUSCULAR; INTRAVENOUS at 03:05

## 2019-01-01 RX ADMIN — Medication 10 ML: at 09:05

## 2019-01-01 RX ADMIN — SODIUM CHLORIDE 1000 ML: 0.9 INJECTION, SOLUTION INTRAVENOUS at 08:05

## 2019-01-01 RX ADMIN — VANCOMYCIN HYDROCHLORIDE 2250 MG: 100 INJECTION, POWDER, LYOPHILIZED, FOR SOLUTION INTRAVENOUS at 04:05

## 2019-01-01 RX ADMIN — PHENYLEPHRINE HYDROCHLORIDE 0.5 MCG/KG/MIN: 10 INJECTION INTRAVENOUS at 10:05

## 2019-01-01 RX ADMIN — Medication 25 MCG/HR: at 02:05

## 2019-05-27 PROBLEM — A41.9 SEPSIS: Status: ACTIVE | Noted: 2019-01-01

## 2019-05-27 PROBLEM — J18.9 PNEUMONIA OF LEFT UPPER LOBE DUE TO INFECTIOUS ORGANISM: Status: ACTIVE | Noted: 2019-01-01

## 2019-05-27 PROBLEM — C91.40 HAIRY CELL LEUKEMIA: Status: ACTIVE | Noted: 2019-01-01

## 2019-05-27 PROBLEM — D61.810 PANCYTOPENIA DUE TO ANTINEOPLASTIC CHEMOTHERAPY: Status: ACTIVE | Noted: 2019-01-01

## 2019-05-27 PROBLEM — C50.919 BREAST CA: Status: ACTIVE | Noted: 2019-01-01

## 2019-05-27 PROBLEM — T45.1X5A PANCYTOPENIA DUE TO ANTINEOPLASTIC CHEMOTHERAPY: Status: ACTIVE | Noted: 2019-01-01

## 2019-05-27 PROBLEM — D61.818 PANCYTOPENIA: Status: ACTIVE | Noted: 2019-01-01

## 2019-05-27 NOTE — PROGRESS NOTES
Pharmacokinetic Initial Assessment: IV Vancomycin    Assessment/Plan:    Initiate intravenous vancomycin with loading dose of 2250 mg once followed by a maintenance dose of vancomycin 1000mg IV every 12 hours  Desired empiric serum trough concentration is 10 to 20 mcg/mL.  Draw vancomycin trough level 30 min prior to fourth dose on 5/29 at approximately 0400   Pharmacy will continue to follow and monitor vancomycin.      Please contact pharmacy at extension [phone] with any questions regarding this assessment.     Thank you for the consult,   Karla Baig     Patient brief summary:  Alissa Sadler is a 58 y.o. female initiated on antimicrobial therapy with IV Vancomycin for treatment of suspected pneumonia     Drug Allergies:   Review of patient's allergies indicates:   Allergen Reactions    Adhesive Rash       Actual Body Weight:   74.5 kg    Renal Function:   Estimated Creatinine Clearance: 72.9 mL/min (based on SCr of 0.9 mg/dL).,     Dialysis Method (if applicable):  No dialysis     CBC (last 72 hours):  Recent Labs   Lab Result Units 05/27/19  1410   WBC K/uL 1.15*   Hemoglobin g/dL 10.7*   Hematocrit % 33.0*   Platelets K/uL 42*   Gran% % 23.0*   Lymph% % 62.0*   Mono% % 15.0   Eosinophil% % 0.0   Basophil% % 0.0   Differential Method  Manual       Metabolic Panel (last 72 hours):  Recent Labs   Lab Result Units 05/27/19  1410   Sodium mmol/L 139   Potassium mmol/L 4.1   Chloride mmol/L 104   CO2 mmol/L 23   Glucose mg/dL 110   BUN, Bld mg/dL 14   Creatinine mg/dL 0.9   Albumin g/dL 3.0*   Total Bilirubin mg/dL 1.3*   Alkaline Phosphatase U/L 70   AST U/L 13   ALT U/L 14       Drug levels (last 3 results):  No results for input(s): VANCOMYCINRA, VANCOMYCINPE, VANCOMYCINTR in the last 72 hours.    Microbiologic Results:  Microbiology Results (last 7 days)       Procedure Component Value Units Date/Time    Blood culture x two cultures. Draw prior to antibiotics. [[id number]] Collected:  05/27/19 1410     Order Status:  Sent Specimen:  Blood from Peripheral, Antecubital, Left Updated:  05/27/19 1425    Blood culture x two cultures. Draw prior to antibiotics. [[id number]] Collected:  05/27/19 1420    Order Status:  Sent Specimen:  Blood from Peripheral, Wrist, Left Updated:  05/27/19 1421

## 2019-05-27 NOTE — ASSESSMENT & PLAN NOTE
- Blood cultures pending   - IV vanc/zosyn  - Neb tx   - Mucinex   - Anti-tussive PRN   - Acapella

## 2019-05-27 NOTE — ED NOTES
Patient states she was told by PCP, Dr. Amador Gay, to come to the ER due to heart rate of 150s. Patient states she has been fatigued and had a congestive cough since Friday.    Patient moved to ED room 15, patient assisted onto stretcher and changed into a gown. Patient placed on cardiac monitor, continuous pulse oximetry and automatic blood pressure cuff. Bed placed in low locked position, side rails up x 2, call light is within reach of patient or family, orientation to room and explanation of wait provided to family and patient, alarms set and turned on for monitor and pulse ox, awaiting MD evaluation and orders, will continue to monitor.    Patient identifies self as Alissa Sadler      LOC: The patient is awake, alert and aware of environment with an appropriate affect, the patient is oriented x 3 and speaking appropriately.  APPEARANCE: Patient appears drowsy, patient is clean and well groomed, patient's clothing is properly fastened.  SKIN: The skin is warm and dry, color consistent with ethnicity, patient has normal skin turgor and moist mucus membranes, skin intact, no breakdown or bruising noted.  MUSCULOSKELETAL: Patient moving all extremities well, no obvious swelling or deformities noted.  RESPIRATORY: Respirations are spontaneous, diminished breath sounds noted to left lobe, no accessory muscle use noted.  CARDIAC: Patient has a rhythm of sinus tachycardia at 107 BPM, no peripheral edema noted, capillary refill < 3 seconds.  ABDOMEN: Soft and non tender to palpation, no distention noted.  NEUROLOGIC: PERRL, 3 mm bilaterally, eyes open spontaneously, behavior appropriate to situation, follows commands, facial expression symmetrical, bilateral hand grasp equal and even, purposeful motor response noted, normal sensation in all extremities when touched with a finger.

## 2019-05-27 NOTE — SUBJECTIVE & OBJECTIVE
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      Current Outpatient Medications on File Prior to Encounter   Medication Sig    tamoxifen citrate (TAMOXIFEN ORAL) Take by mouth.    [DISCONTINUED] magnesium oxide-Mg AA chelate (MG-PLUS-PROTEIN) 133 mg Tab Take by mouth.    [DISCONTINUED] multivitamin capsule Take 1 capsule by mouth once daily.    [DISCONTINUED] ondansetron (ZOFRAN) 4 MG tablet Take 1 tablet (4 mg total) by mouth every 6 (six) hours.    [DISCONTINUED] oxycodone-acetaminophen (PERCOCET)  mg per tablet Take 1 tablet by mouth every 4 (four) hours as needed for Pain.    [DISCONTINUED] oxycodone-acetaminophen (PERCOCET)  mg per tablet Take 1 tablet by mouth every 6 (six) hours as needed for Pain.    [DISCONTINUED] PHENYLEPHRINE HCL (NASAL DECONGESTANT, PE, NASL) by Nasal route.    [DISCONTINUED] POTASSIUM CHLORIDE ORAL Take by mouth.    [DISCONTINUED] UNABLE TO FIND medication name: Videl Hormone patch     Family History     Problem Relation (Age of Onset)    Lung cancer Father        Tobacco Use    Smoking status: Never Smoker    Smokeless tobacco: Never Used   Substance and Sexual Activity    Alcohol use: No    Drug use: No    Sexual activity: Not on file     Review of Systems   Constitutional: Positive for fever. Negative for activity change, appetite change, chills, diaphoresis, fatigue and unexpected weight change.        Generalized weakness and fatigue   HENT: Negative for congestion, drooling, facial swelling, rhinorrhea, sinus pressure, sneezing and sore throat.    Eyes: Negative for discharge,  redness, itching and visual disturbance.   Respiratory: Positive for cough and shortness of breath. Negative for apnea, choking, chest tightness, wheezing and stridor.    Cardiovascular: Negative for chest pain, palpitations and leg swelling.   Gastrointestinal: Negative for abdominal distention, abdominal pain, anal bleeding, blood in stool, constipation, diarrhea, nausea and vomiting.   Genitourinary: Negative for decreased urine volume, difficulty urinating, dysuria, frequency, hematuria, pelvic pain, urgency, vaginal bleeding and vaginal discharge.   Musculoskeletal: Negative for arthralgias, back pain, gait problem, joint swelling, myalgias, neck pain and neck stiffness.   Skin: Negative for color change, pallor, rash and wound.   Neurological: Negative for dizziness, seizures, facial asymmetry, speech difficulty, weakness, light-headedness, numbness and headaches.   Psychiatric/Behavioral: Negative for agitation, confusion, hallucinations and suicidal ideas.   All other systems reviewed and are negative.    Objective:     Vital Signs (Most Recent):  Temp: 97.9 °F (36.6 °C) (05/27/19 1430)  Pulse: 93 (05/27/19 1630)  Resp: 20 (05/27/19 1630)  BP: (!) 112/59 (05/27/19 1630)  SpO2: 95 % (05/27/19 1630) Vital Signs (24h Range):  Temp:  [97.7 °F (36.5 °C)-97.9 °F (36.6 °C)] 97.9 °F (36.6 °C)  Pulse:  [] 93  Resp:  [20] 20  SpO2:  [92 %-95 %] 95 %  BP: (104-117)/(59-66) 112/59     Weight: 74.5 kg (164 lb 3.9 oz)  Body mass index is 24.25 kg/m².    Physical Exam   Constitutional: She is oriented to person, place, and time. She appears well-developed and well-nourished.   HENT:   Head: Normocephalic and atraumatic.   Eyes: Pupils are equal, round, and reactive to light. Conjunctivae and EOM are normal.   Neck: Normal range of motion. Neck supple.   Cardiovascular: Regular rhythm, normal heart sounds and intact distal pulses.   No murmur heard.  Tachycardic   Pulmonary/Chest: Effort normal and breath sounds  normal. No respiratory distress.   Abdominal: Soft. Bowel sounds are normal. She exhibits no distension. There is no tenderness.   Musculoskeletal: Normal range of motion. She exhibits no edema, tenderness or deformity.   Neurological: She is alert and oriented to person, place, and time. She has normal reflexes.   Skin: Skin is warm and dry. No erythema.   Psychiatric: She has a normal mood and affect. Her behavior is normal.   Nursing note and vitals reviewed.        CRANIAL NERVES     CN III, IV, VI   Pupils are equal, round, and reactive to light.  Extraocular motions are normal.        Significant Labs: All pertinent labs within the past 24 hours have been reviewed.    Significant Imaging:   Imaging Results          X-Ray Chest AP Portable (Final result)  Result time 05/27/19 14:15:23    Final result by MARTÍNEZ Rao Sr., MD (05/27/19 14:15:23)                 Impression:      There has been interval silhouetting of the left border of the heart.  This is characteristic a subtle pneumonia..      Electronically signed by: Alex Rao MD  Date:    05/27/2019  Time:    14:15             Narrative:    EXAMINATION:  XR CHEST AP PORTABLE    CLINICAL HISTORY:  Sepsis;    COMPARISON:  08/23/2011    FINDINGS:  There has been interval silhouetting of the left border of the heart.  The right lung is clear.  There is no pneumothorax.  The costophrenic angles are sharp.

## 2019-05-27 NOTE — HPI
Alissa Sadler is a 59 yo female with a PMH breast CA, hairy cell leukemia and anemia who was sent to the ER from her PCPs office with an elevated heart rate. EKG in the ER showed sinus tach with HR in the 130's. Associated symptoms include a subjective fever, non-productive cough, generalized weakness and fatigue. The patient reports that her symptoms began 2 days ago. She denies any modifying factors. Patient denies chills, CP, pnd, orthopnea, palpitations, diaphoresis, headache, blurred vision, numbness, tingling, dizziness, localized weakness, n/v/d, abdominal pain, blood in stools, melena, hematemesis, urinary frequency, urgency, dysuria or hematuria. CXR showed silhouetting of the left border of the heart, characteristic a subtle pneumonia. The patient was found to have a WBC of 1.15. The patient is being admitted for treatment of PNA/Sepsis.

## 2019-05-27 NOTE — ASSESSMENT & PLAN NOTE
- Blood cultures pending   - IV vanc/zosyn  - Lactic acid is negative  - Procalcitonin is 0.23  -

## 2019-05-27 NOTE — ED PROVIDER NOTES
SCRIBE #1 NOTE: I, Regine Moreno, am scribing for, and in the presence of, Sandra Sadler MD. I have scribed the entire note.         History     Chief Complaint   Patient presents with    Tachycardia     Pt sent by PCP for fast heart rate. Pt reports going to PCP for increased weakness over the weekend.       Review of patient's allergies indicates:   Allergen Reactions    Adhesive Rash         History of Present Illness   HPI    5/27/2019, 1:33 PM  History obtained from the patient      History of Present Illness: Alissa Sadler is a 58 y.o. female patient with PMHx of hairy cell leukemia and breast cancer who presents to the Emergency Department for tachycardia which onset gradually PTA. Patient states she began having fatigue 2 days ago and then a cough started last night and fever (Tmax 100.3F) that started this morning. Patient states she was evaluated by her PCP today for sxs when he noted patient's HR was 136. Symptoms are constant and moderate in severity. No mitigating or exacerbating factors reported. No other sxs reported. Patient denies any chills, CP, SOB, N/V/D, and all other sxs at this time. Patient reports platelet count in March was 05072. Patient states she is currently taking tamoxifen. Patient denies use of decongestant and stimulants. No further complaints or concerns at this time.       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 Negative for chills.   HENT: Negative for sore throat.    Respiratory: Positive for cough. Negative for shortness of breath.    Cardiovascular: Negative for chest pain.   Gastrointestinal: Negative for diarrhea, nausea and vomiting.   Genitourinary: Negative for dysuria.   Musculoskeletal: Negative for back pain.   Skin: Negative for rash.   Neurological: Negative for weakness.   Hematological: Does not bruise/bleed easily.   All other systems reviewed and are negative.       Physical Exam     Initial Vitals   BP Pulse Resp Temp SpO2   05/27/19 1310 05/27/19 1236 05/27/19 1310 05/27/19 1236 05/27/19 1236   104/66 (!) 132 20 97.7 °F (36.5 °C) (!) 92 %      MAP       --                 Physical Exam  Nursing Notes and Vital Signs Reviewed.  Constitutional: Patient is in no acute distress. Well-developed and well-nourished. Nontoxic. Non-ill appearing.  Head: Atraumatic. Normocephalic.  Eyes: PERRL. EOM intact. Conjunctivae are not pale. No scleral icterus.  ENT: Mucous membranes are moist. Oropharynx is clear and symmetric.    Neck: Supple. Full ROM. No lymphadenopathy.  Cardiovascular: Tachycardic. Regular rhythm. No murmurs, rubs, or gallops. Distal pulses are 2+ and symmetric.  Pulmonary/Chest: No respiratory distress. Clear to auscultation bilaterally. No wheezing or rales.  Abdominal: Soft and non-distended.  There is no tenderness.  No rebound, guarding, or rigidity.   Musculoskeletal: Moves all extremities. No obvious deformities. No edema.  Skin: Warm and dry. Pale.  Neurological:  Alert, awake, and appropriate.  Normal speech.  No acute focal neurological deficits are appreciated.  Psychiatric: Normal affect. Good eye contact. Appropriate in content.     ED Course   Critical Care  Date/Time: 5/27/2019 4:19 PM  Performed by: Sandra  "Rodri Sadler MD  Authorized by: Sandra Sadler MD   Direct patient critical care time: 10 minutes  Additional history critical care time: 9 minutes  Ordering / reviewing critical care time: 9 minutes  Documentation critical care time: 8 minutes  Consulting other physicians critical care time: 9 minutes  Total critical care time (exclusive of procedural time) : 45 minutes  Critical care time was exclusive of separately billable procedures and treating other patients and teaching time.  Critical care was necessary to treat or prevent imminent or life-threatening deterioration of the following conditions: sepsis.  Critical care was time spent personally by me on the following activities: blood draw for specimens, development of treatment plan with patient or surrogate, discussions with consultants, interpretation of cardiac output measurements, evaluation of patient's response to treatment, examination of patient, obtaining history from patient or surrogate, ordering and performing treatments and interventions, ordering and review of laboratory studies, ordering and review of radiographic studies, pulse oximetry, re-evaluation of patient's condition and review of old charts.        ED Vital Signs:  Vitals:    05/27/19 1236 05/27/19 1310 05/27/19 1330 05/27/19 1348   BP:  104/66 (!) 104/59    Pulse: (!) 132 108 100 96   Resp:  20 20    Temp: 97.7 °F (36.5 °C)      TempSrc: Oral      SpO2: (!) 92% (!) 94% (!) 92%    Weight: 74.5 kg (164 lb 3.9 oz)      Height:        05/27/19 1430 05/27/19 1530 05/27/19 1630 05/27/19 1716   BP: 115/60 (!) 117/59 (!) 112/59    Pulse: 92 97 93    Resp: 20 20 20    Temp: 97.9 °F (36.6 °C)      TempSrc: Oral      SpO2: 95% 95% 95%    Weight:       Height:    5' 9" (1.753 m)       Abnormal Lab Results:  Labs Reviewed   CBC W/ AUTO DIFFERENTIAL - Abnormal; Notable for the following components:       Result Value    WBC 1.15 (*)     RBC 3.52 (*)     Hemoglobin 10.7 (*)     Hematocrit " 33.0 (*)     RDW 16.6 (*)     Platelets 42 (*)     Gran% 23.0 (*)     Lymph% 62.0 (*)     Platelet Estimate Decreased (*)     All other components within normal limits    Narrative:     WBC critical result(s) called and verbal readback obtained from   Oriana Mcgill RN ED, 05/27/2019 15:17   COMPREHENSIVE METABOLIC PANEL - Abnormal; Notable for the following components:    Albumin 3.0 (*)     Total Bilirubin 1.3 (*)     All other components within normal limits   CULTURE, BLOOD   CULTURE, BLOOD   LACTIC ACID, PLASMA   PROTIME-INR   APTT   PROCALCITONIN   TROPONIN I   URINALYSIS, REFLEX TO URINE CULTURE   TYPE & SCREEN        All Lab Results:  Results for orders placed or performed during the hospital encounter of 05/27/19   CBC auto differential   Result Value Ref Range    WBC 1.15 (LL) 3.90 - 12.70 K/uL    RBC 3.52 (L) 4.00 - 5.40 M/uL    Hemoglobin 10.7 (L) 12.0 - 16.0 g/dL    Hematocrit 33.0 (L) 37.0 - 48.5 %    Mean Corpuscular Volume 94 82 - 98 fL    Mean Corpuscular Hemoglobin 30.4 27.0 - 31.0 pg    Mean Corpuscular Hemoglobin Conc 32.4 32.0 - 36.0 g/dL    RDW 16.6 (H) 11.5 - 14.5 %    Platelets 42 (L) 150 - 350 K/uL    MPV 11.1 9.2 - 12.9 fL    Lymph # CANCELED 1.0 - 4.8 K/uL    Mono # CANCELED 0.3 - 1.0 K/uL    Eos # CANCELED 0.0 - 0.5 K/uL    Baso # CANCELED 0.00 - 0.20 K/uL    Gran% 23.0 (L) 38.0 - 73.0 %    Lymph% 62.0 (H) 18.0 - 48.0 %    Mono% 15.0 4.0 - 15.0 %    Eosinophil% 0.0 0.0 - 8.0 %    Basophil% 0.0 0.0 - 1.9 %    Platelet Estimate Decreased (A)     Aniso Slight     Poik Slight     Poly Occasional     Hypo Occasional     Ovalocytes Occasional     Tear Drop Cells Occasional     Stomatocytes Present     Spherocytes Occasional     Differential Method Manual    Comprehensive metabolic panel   Result Value Ref Range    Sodium 139 136 - 145 mmol/L    Potassium 4.1 3.5 - 5.1 mmol/L    Chloride 104 95 - 110 mmol/L    CO2 23 23 - 29 mmol/L    Glucose 110 70 - 110 mg/dL    BUN, Bld 14 6 - 20 mg/dL     Creatinine 0.9 0.5 - 1.4 mg/dL    Calcium 8.8 8.7 - 10.5 mg/dL    Total Protein 6.3 6.0 - 8.4 g/dL    Albumin 3.0 (L) 3.5 - 5.2 g/dL    Total Bilirubin 1.3 (H) 0.1 - 1.0 mg/dL    Alkaline Phosphatase 70 55 - 135 U/L    AST 13 10 - 40 U/L    ALT 14 10 - 44 U/L    Anion Gap 12 8 - 16 mmol/L    eGFR if African American >60 >60 mL/min/1.73 m^2    eGFR if non African American >60 >60 mL/min/1.73 m^2   Lactic acid, plasma #1   Result Value Ref Range    Lactate (Lactic Acid) 0.9 0.5 - 2.2 mmol/L   Protime-INR   Result Value Ref Range    Prothrombin Time 11.2 9.0 - 12.5 sec    INR 1.0 0.8 - 1.2   APTT   Result Value Ref Range    aPTT 23.4 21.0 - 32.0 sec   Procalcitonin   Result Value Ref Range    Procalcitonin 0.23 <0.25 ng/mL   Troponin I   Result Value Ref Range    Troponin I 0.006 0.000 - 0.026 ng/mL   Type & Screen   Result Value Ref Range    Group & Rh O POS     Indirect Christina NEG        Imaging Results:  Imaging Results          X-Ray Chest AP Portable (Final result)  Result time 05/27/19 14:15:23    Final result by MARTÍNEZ Rao Sr., MD (05/27/19 14:15:23)                 Impression:      There has been interval silhouetting of the left border of the heart.  This is characteristic a subtle pneumonia..      Electronically signed by: Alex Rao MD  Date:    05/27/2019  Time:    14:15             Narrative:    EXAMINATION:  XR CHEST AP PORTABLE    CLINICAL HISTORY:  Sepsis;    COMPARISON:  08/23/2011    FINDINGS:  There has been interval silhouetting of the left border of the heart.  The right lung is clear.  There is no pneumothorax.  The costophrenic angles are sharp.                                 The EKG was ordered, reviewed, and independently interpreted by the ED provider.  Interpretation time: 1241  Rate: 135 BPM  Rhythm: sinus tachycardia  Interpretation: Possible left atrial enlargement. Nonspecific ST abnormality. No STEMI.          The Emergency Provider reviewed the vital signs and test results,  which are outlined above.     ED Discussion     3:18 PM: Re-evaluated pt. Pt is resting comfortably and is in no acute distress. Patient states she feels better at this time. D/w pt all pertinent results. D/w pt any concerns expressed at this time. Answered all questions. Pt expresses understanding at this time.    4:11 PM: Discussed pt's case with Dr. Chadwick (Hem/Onc) who recommends agrees with admission here.    4:15 PM: Discussed case with Mildred Molina NP ([facility]). Dr. Ruffin agrees with current care and management of pt and accepts admission.   Admitting Service: [facility]   Admitting Physician: Dr. Ruffin  Admit to: Med/tele    4:20 PM: Re-evaluated pt. I have discussed test results, shared treatment plan, and the need for admission with patient and family at bedside. Pt and family express understanding at this time and agree with all information. All questions answered. Pt and family have no further questions or concerns at this time. Pt is ready for admit.        ED Medication(s):  Medications   piperacillin-tazobactam 4.5 g in dextrose 5 % 100 mL IVPB (ready to mix system) (has no administration in time range)   sodium chloride 0.9% flush 10 mL (has no administration in time range)   acetaminophen tablet 650 mg (has no administration in time range)   ondansetron tablet 4 mg (has no administration in time range)   albuterol-ipratropium 2.5 mg-0.5 mg/3 mL nebulizer solution 3 mL (has no administration in time range)   vancomycin in dextrose 5 % 1 gram/250 mL IVPB 1,000 mg (1,000 mg Intravenous Trough Due 30 minutes Before Dose 5/29/19 0400)   0.9%  NaCl infusion (has no administration in time range)   dextromethorphan-guaifenesin  mg per 12 hr tablet 1 tablet (has no administration in time range)   benzonatate capsule 100 mg (has no administration in time range)   sodium chloride 0.9% bolus 1,000 mL (0 mLs Intravenous Stopped 5/27/19 8018)   piperacillin-tazobactam 4.5 g in dextrose 5  % 100 mL IVPB (ready to mix system) (0 g Intravenous Stopped 5/27/19 1625)   vancomycin (VANCOCIN) 2,250 mg in dextrose 5 % 500 mL IVPB (2,250 mg Intravenous New Bag 5/27/19 1625)     New Prescriptions    No medications on file                Medical Decision Making     Medical Decision Making:   Clinical Tests:   Lab Tests: Ordered and Reviewed  Radiological Study: Ordered and Reviewed  Medical Tests: Ordered and Reviewed             Scribe Attestation:   Scribe #1: I performed the above scribed service and the documentation accurately describes the services I performed. I attest to the accuracy of the note.     Attending:   Physician Attestation Statement for Scribe #1: I, Sandra Sadler MD, personally performed the services described in this documentation, as scribed by Regine Moreno, in my presence, and it is both accurate and complete.           Clinical Impression       ICD-10-CM ICD-9-CM   1. Pneumonia of left upper lobe due to infectious organism J18.1 486   2. Tachycardia R00.0 785.0   3. History of leukemia Z85.6 V10.60   4. Pancytopenia D61.818 284.19       Disposition:   Disposition: Admitted  Condition: Fair         Sandra Sadler MD  05/27/19 1829       Sandra Sadler MD  05/27/19 1829

## 2019-05-27 NOTE — H&P
[facility] Hospital Medicine  History & Physical    Patient Name: Alissa Sadler  MRN: [id number]  Admission Date: 5/27/2019  Attending Physician: Sandra Sadler MD   Primary Care Provider: Amador Gay MD         Patient information was obtained from patient and ER records.     Subjective:     Principal Problem:Sepsis    Chief Complaint:   Chief Complaint   Patient presents with    Tachycardia     Pt sent by PCP for fast heart rate. Pt reports going to PCP for increased weakness over the weekend.        HPI: Alissa Sadler is a 57 yo female with a PMH breast CA, hairy cell leukemia and anemia who was sent to the ER from her PCPs office with an elevated heart rate. EKG in the ER showed sinus tach with HR in the 130's. Associated symptoms include a subjective fever, non-productive cough, generalized weakness and fatigue. The patient reports that her symptoms began 2 days ago. She denies any modifying factors. Patient denies chills, CP, pnd, orthopnea, palpitations, diaphoresis, headache, blurred vision, numbness, tingling, dizziness, localized weakness, n/v/d, abdominal pain, blood in stools, melena, hematemesis, urinary frequency, urgency, dysuria or hematuria. CXR showed silhouetting of the left border of the heart, characteristic a subtle pneumonia. The patient was found to have a WBC of 1.15. The patient is being admitted for treatment of PNA/Sepsis.         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      Current Outpatient Medications on File Prior to Encounter   Medication Sig    tamoxifen citrate  (TAMOXIFEN ORAL) Take by mouth.    [DISCONTINUED] magnesium oxide-Mg AA chelate (MG-PLUS-PROTEIN) 133 mg Tab Take by mouth.    [DISCONTINUED] multivitamin capsule Take 1 capsule by mouth once daily.    [DISCONTINUED] ondansetron (ZOFRAN) 4 MG tablet Take 1 tablet (4 mg total) by mouth every 6 (six) hours.    [DISCONTINUED] oxycodone-acetaminophen (PERCOCET)  mg per tablet Take 1 tablet by mouth every 4 (four) hours as needed for Pain.    [DISCONTINUED] oxycodone-acetaminophen (PERCOCET)  mg per tablet Take 1 tablet by mouth every 6 (six) hours as needed for Pain.    [DISCONTINUED] PHENYLEPHRINE HCL (NASAL DECONGESTANT, PE, NASL) by Nasal route.    [DISCONTINUED] POTASSIUM CHLORIDE ORAL Take by mouth.    [DISCONTINUED] UNABLE TO FIND medication name: Videl Hormone patch     Family History     Problem Relation (Age of Onset)    Lung cancer Father        Tobacco Use    Smoking status: Never Smoker    Smokeless tobacco: Never Used   Substance and Sexual Activity    Alcohol use: No    Drug use: No    Sexual activity: Not on file     Review of Systems   Constitutional: Positive for fever. Negative for activity change, appetite change, chills, diaphoresis, fatigue and unexpected weight change.        Generalized weakness and fatigue   HENT: Negative for congestion, drooling, facial swelling, rhinorrhea, sinus pressure, sneezing and sore throat.    Eyes: Negative for discharge, redness, itching and visual disturbance.   Respiratory: Positive for cough and shortness of breath. Negative for apnea, choking, chest tightness, wheezing and stridor.    Cardiovascular: Negative for chest pain, palpitations and leg swelling.   Gastrointestinal: Negative for abdominal distention, abdominal pain, anal bleeding, blood in stool, constipation, diarrhea, nausea and vomiting.   Genitourinary: Negative for decreased urine volume, difficulty urinating, dysuria, frequency, hematuria, pelvic pain, urgency, vaginal  bleeding and vaginal discharge.   Musculoskeletal: Negative for arthralgias, back pain, gait problem, joint swelling, myalgias, neck pain and neck stiffness.   Skin: Negative for color change, pallor, rash and wound.   Neurological: Negative for dizziness, seizures, facial asymmetry, speech difficulty, weakness, light-headedness, numbness and headaches.   Psychiatric/Behavioral: Negative for agitation, confusion, hallucinations and suicidal ideas.   All other systems reviewed and are negative.    Objective:     Vital Signs (Most Recent):  Temp: 97.9 °F (36.6 °C) (05/27/19 1430)  Pulse: 93 (05/27/19 1630)  Resp: 20 (05/27/19 1630)  BP: (!) 112/59 (05/27/19 1630)  SpO2: 95 % (05/27/19 1630) Vital Signs (24h Range):  Temp:  [97.7 °F (36.5 °C)-97.9 °F (36.6 °C)] 97.9 °F (36.6 °C)  Pulse:  [] 93  Resp:  [20] 20  SpO2:  [92 %-95 %] 95 %  BP: (104-117)/(59-66) 112/59     Weight: 74.5 kg (164 lb 3.9 oz)  Body mass index is 24.25 kg/m².    Physical Exam   Constitutional: She is oriented to person, place, and time. She appears well-developed and well-nourished.   HENT:   Head: Normocephalic and atraumatic.   Eyes: Pupils are equal, round, and reactive to light. Conjunctivae and EOM are normal.   Neck: Normal range of motion. Neck supple.   Cardiovascular: Regular rhythm, normal heart sounds and intact distal pulses.   No murmur heard.  Tachycardic   Pulmonary/Chest: Effort normal and breath sounds normal. No respiratory distress.   Abdominal: Soft. Bowel sounds are normal. She exhibits no distension. There is no tenderness.   Musculoskeletal: Normal range of motion. She exhibits no edema, tenderness or deformity.   Neurological: She is alert and oriented to person, place, and time. She has normal reflexes.   Skin: Skin is warm and dry. No erythema.   Psychiatric: She has a normal mood and affect. Her behavior is normal.   Nursing note and vitals reviewed.        CRANIAL NERVES     CN III, IV, VI   Pupils are equal,  round, and reactive to light.  Extraocular motions are normal.        Significant Labs: All pertinent labs within the past 24 hours have been reviewed.    Significant Imaging:   Imaging Results          X-Ray Chest AP Portable (Final result)  Result time 05/27/19 14:15:23    Final result by MARTÍNEZ Rao Sr., MD (05/27/19 14:15:23)                 Impression:      There has been interval silhouetting of the left border of the heart.  This is characteristic a subtle pneumonia..      Electronically signed by: Alex Rao MD  Date:    05/27/2019  Time:    14:15             Narrative:    EXAMINATION:  XR CHEST AP PORTABLE    CLINICAL HISTORY:  Sepsis;    COMPARISON:  08/23/2011    FINDINGS:  There has been interval silhouetting of the left border of the heart.  The right lung is clear.  There is no pneumothorax.  The costophrenic angles are sharp.                                  Assessment/Plan:     * Sepsis  - Blood cultures pending   - IV vanc/zosyn  - Lactic acid is negative  - Procalcitonin is 0.23  -     Pneumonia of left upper lobe due to infectious organism  - Blood cultures pending   - IV vanc/zosyn  - Neb tx   - Mucinex   - Anti-tussive PRN   - Acapella     Breast CA  - Hem/onc consulted  - Keep OP follow up with primary Oncologist       Pancytopenia  - Monitor and transfuse if symptomatic   - CBC in am       Hairy cell leukemia  - Hem/onc consulted  - Keep OP follow up with primary Oncologist         VTE Risk Mitigation (From admission, onward)        Ordered     IP VTE LOW RISK PATIENT  Once      05/27/19 1730     Place sequential compression device  Until discontinued      05/27/19 1730             Maurilio Ramos NP  Department of Hospital Medicine   [facility]

## 2019-05-28 NOTE — PROGRESS NOTES
[facility]   Hematology/Oncology  Progress Note    Patient Name: Alissa Sadler  Admission Date: 5/27/2019  Hospital Length of Stay: 1 days  Code Status: Full Code     Subjective:     HPI:      58-year-old female with history of DCIS and hairy cell leukemia diagnosed in approximately 2012.  She has not required treatment, however, her counts have slowly declined over the previous 5 years.  She was sent to the emergency room from her primary care doctor with tachycardia/fever which had been present for the previous 48 hr.  Chest x-ray was consistent with pneumonia and she was admitted for pneumonia/sepsis    Oncology Treatment Plan:   [No treatment plan]    Medications:  Continuous Infusions:   sodium chloride 0.9% 75 mL/hr at 05/27/19 2115     Scheduled Meds:   dextromethorphan-guaifenesin  mg  1 tablet Oral BID    piperacillin-tazobactam (ZOSYN) IVPB  4.5 g Intravenous Q8H    vancomycin (VANCOCIN) IVPB  1,000 mg Intravenous Q12H     PRN Meds:acetaminophen, albuterol-ipratropium, benzonatate, ondansetron, sodium chloride 0.9%     Review of patient's allergies indicates:   Allergen Reactions    Adhesive Rash        Past Medical History:   Diagnosis Date    Brain tumor     Hairy cell leukemia 2016     Past Surgical History:   Procedure Laterality Date    APPENDECTOMY      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 Negative for appetite change, chills and diaphoresis.   HENT: Negative for congestion, dental problem, drooling, ear discharge, facial swelling, mouth sores, nosebleeds,  sinus pressure and sinus pain.    Eyes: Negative for photophobia, pain, discharge, itching and visual disturbance.   Respiratory: Positive for shortness of breath. Negative for apnea, cough, choking, chest tightness and stridor.    Cardiovascular: Positive for palpitations. Negative for chest pain and leg swelling.   Gastrointestinal: Negative for abdominal distention, abdominal pain, anal bleeding, blood in stool, constipation, diarrhea, nausea and vomiting.   Endocrine: Negative for cold intolerance, heat intolerance, polydipsia and polyphagia.   Genitourinary: Negative for difficulty urinating, dyspareunia, dysuria, flank pain, frequency, pelvic pain and vaginal bleeding.   Musculoskeletal: Negative for arthralgias, back pain, gait problem, joint swelling, myalgias and neck pain.   Skin: Negative for pallor, rash and wound.   Allergic/Immunologic: Negative for environmental allergies and immunocompromised state.   Neurological: Negative for dizziness, tremors, seizures, facial asymmetry, speech difficulty, light-headedness, numbness and headaches.   Hematological: Negative for adenopathy. Does not bruise/bleed easily.   Psychiatric/Behavioral: Negative for agitation, behavioral problems, confusion, dysphoric mood and hallucinations. The patient is not nervous/anxious and is not hyperactive.      Objective:     Vital Signs (Most Recent):  Temp: (!) 101.9 °F (38.8 °C) (05/28/19 1556)  Pulse: 104 (05/28/19 1556)  Resp: 16 (05/28/19 1556)  BP: (!) 104/56 (05/28/19 1556)  SpO2: 98 % (05/28/19 1556) Vital Signs (24h Range):  Temp:  [96.8 °F (36 °C)-101.9 °F (38.8 °C)] 101.9 °F (38.8 °C)  Pulse:  [] 104  Resp:  [16-20] 16  SpO2:  [89 %-98 %] 98 %  BP: ()/(50-66) 104/56     Weight: 74.5 kg (164 lb 3.9 oz)  Body mass index is 24.25 kg/m².  Body surface area is 1.9 meters squared.      Intake/Output Summary (Last 24 hours) at 5/28/2019 1603  Last data filed at 5/28/2019 1339  Gross per 24 hour   Intake 1846.25  ml   Output 950 ml   Net 896.25 ml       Physical Exam   Constitutional: She is oriented to person, place, and time. She appears well-developed and well-nourished. No distress.   Well groomed   HENT:   Head: Normocephalic and atraumatic.   Right Ear: External ear normal.   Left Ear: External ear normal.   Nose: Nose normal.   Mouth/Throat: Oropharynx is clear and moist. No oropharyngeal exudate.   Eyes: Pupils are equal, round, and reactive to light. Conjunctivae and EOM are normal. Right eye exhibits no discharge. Left eye exhibits no discharge.   Neck: Normal range of motion. Neck supple. No tracheal deviation present. No thyromegaly present.   Cardiovascular: Normal rate, regular rhythm, normal heart sounds and intact distal pulses. Exam reveals no gallop and no friction rub.   No murmur heard.  Pulmonary/Chest: Effort normal and breath sounds normal. She has no wheezes. She has no rales. She exhibits no tenderness.   Abdominal: Soft. Bowel sounds are normal. She exhibits no distension. There is no tenderness. There is no guarding.   Musculoskeletal: Normal range of motion. She exhibits no edema or tenderness.   Neurological: She is alert and oriented to person, place, and time. She displays normal reflexes. No cranial nerve deficit or sensory deficit. Coordination normal.   Skin: Skin is warm, dry and intact. Capillary refill takes less than 2 seconds. No abrasion, no bruising and no rash noted. No cyanosis. Nails show no clubbing.   Psychiatric: She has a normal mood and affect. Her behavior is normal. Judgment and thought content normal.       Significant Labs:   CBC:   Recent Labs   Lab 05/27/19  1410 05/28/19  0542   WBC 1.15* 1.06*   HGB 10.7* 8.8*   HCT 33.0* 26.7*   PLT 42* 36*   , CMP:   Recent Labs   Lab 05/27/19  1410 05/28/19  0542    137   K 4.1 3.5    106   CO2 23 25    154*   BUN 14 10   CREATININE 0.9 0.8   CALCIUM 8.8 8.3*   PROT 6.3  --    ALBUMIN 3.0*  --    BILITOT 1.3*  --     ALKPHOS 70  --    AST 13  --    ALT 14  --    ANIONGAP 12 6*   EGFRNONAA >60 >60   , Coagulation:   Recent Labs   Lab 05/27/19  1410   INR 1.0   APTT 23.4   , Haptoglobin: No results for input(s): HAPTOGLOBIN in the last 48 hours., Immunology: No results for input(s): SPEP, PARMJIT, INDER, FREELAMBDALI in the last 48 hours., LDH: No results for input(s): LDHCSF, BFSOURCE in the last 48 hours. and LFTs:   Recent Labs   Lab 05/27/19  1410   ALT 14   AST 13   ALKPHOS 70   BILITOT 1.3*   PROT 6.3   ALBUMIN 3.0*       Diagnostic Results:  I have reviewed all pertinent imaging results/findings within the past 24 hours.    Assessment/Plan:     Pancytopenia  Pancytopenia secondary to hairy cell leukemia/sepsis:  --continue to monitor and transfuse irradiated products as needed    Hairy cell leukemia  Hairy cell leukemia diagnosed greater than 5 years ago without previous treatment.  Suspect that she will need treatment in the near future and she plans to follow up with Dr. padron after discharge to discuss treatment.    Pneumonia of left upper lobe due to infectious organism  Continue broad-spectrum antibiotics/supportive care.  Patient has severe neutropenia secondary to hairy cell leukemia which may slow recovery.  --continue to monitor closely  --supportive care/broad-spectrum antibiotic          Thank you for your consult. I will follow-up with patient. Please contact us if you have any additional questions.     Bora Chadwick Jr, MD  Hematology/Oncology  [facility]

## 2019-05-28 NOTE — ASSESSMENT & PLAN NOTE
Pancytopenia secondary to hairy cell leukemia/sepsis:  --continue to monitor and transfuse irradiated products as needed

## 2019-05-28 NOTE — SUBJECTIVE & OBJECTIVE
Interval History: No acute issues overnight. The patient reports some improvement in symptoms but continues to report a non-productive cough. Platelets are 36K this AM. Will continue to monitor. Hem/onc following.       Review of Systems   Constitutional: Positive for fever. Negative for activity change, appetite change, chills, diaphoresis, fatigue and unexpected weight change.        Generalized weakness and fatigue   HENT: Negative for congestion, drooling, facial swelling, rhinorrhea, sinus pressure, sneezing and sore throat.    Eyes: Negative for discharge, redness, itching and visual disturbance.   Respiratory: Positive for cough and shortness of breath. Negative for apnea, choking, chest tightness, wheezing and stridor.    Cardiovascular: Negative for chest pain, palpitations and leg swelling.   Gastrointestinal: Negative for abdominal distention, abdominal pain, anal bleeding, blood in stool, constipation, diarrhea, nausea and vomiting.   Genitourinary: Negative for decreased urine volume, difficulty urinating, dysuria, frequency, hematuria, pelvic pain, urgency, vaginal bleeding and vaginal discharge.   Musculoskeletal: Negative for arthralgias, back pain, gait problem, joint swelling, myalgias, neck pain and neck stiffness.   Skin: Negative for color change, pallor, rash and wound.   Neurological: Negative for dizziness, seizures, facial asymmetry, speech difficulty, weakness, light-headedness, numbness and headaches.   Psychiatric/Behavioral: Negative for agitation, confusion, hallucinations and suicidal ideas.   All other systems reviewed and are negative.    Objective:     Vital Signs (Most Recent):  Temp: 96.8 °F (36 °C) (05/28/19 0708)  Pulse: 99 (05/28/19 0842)  Resp: 18 (05/28/19 0842)  BP: (!) 100/57 (05/28/19 0708)  SpO2: 97 % (05/28/19 0842) Vital Signs (24h Range):  Temp:  [96.8 °F (36 °C)-100.3 °F (37.9 °C)] 96.8 °F (36 °C)  Pulse:  [] 99  Resp:  [16-20] 18  SpO2:  [89 %-97 %] 97 %  BP:  ()/(50-66) 100/57     Weight: 74.5 kg (164 lb 3.9 oz)  Body mass index is 24.25 kg/m².    Intake/Output Summary (Last 24 hours) at 5/28/2019 1256  Last data filed at 5/28/2019 0630  Gross per 24 hour   Intake 2606.25 ml   Output 650 ml   Net 1956.25 ml      Physical Exam   Constitutional: She is oriented to person, place, and time. She appears well-developed and well-nourished.   HENT:   Head: Normocephalic and atraumatic.   Eyes: Pupils are equal, round, and reactive to light. Conjunctivae and EOM are normal.   Neck: Normal range of motion. Neck supple.   Cardiovascular: Regular rhythm, normal heart sounds and intact distal pulses.   No murmur heard.  Tachycardic   Pulmonary/Chest: Effort normal and breath sounds normal. No respiratory distress.   Abdominal: Soft. Bowel sounds are normal. She exhibits no distension. There is no tenderness.   Musculoskeletal: Normal range of motion. She exhibits no edema, tenderness or deformity.   Neurological: She is alert and oriented to person, place, and time. She has normal reflexes.   Skin: Skin is warm and dry. No erythema.   Psychiatric: She has a normal mood and affect. Her behavior is normal.   Nursing note and vitals reviewed.      Significant Labs: All pertinent labs within the past 24 hours have been reviewed.    Significant Imaging:   Imaging Results          X-Ray Chest AP Portable (Final result)  Result time 05/27/19 14:15:23    Final result by MARTÍNEZ Rao Sr., MD (05/27/19 14:15:23)                 Impression:      There has been interval silhouetting of the left border of the heart.  This is characteristic a subtle pneumonia..      Electronically signed by: Alex Rao MD  Date:    05/27/2019  Time:    14:15             Narrative:    EXAMINATION:  XR CHEST AP PORTABLE    CLINICAL HISTORY:  Sepsis;    COMPARISON:  08/23/2011    FINDINGS:  There has been interval silhouetting of the left border of the heart.  The right lung is clear.  There is no  pneumothorax.  The costophrenic angles are sharp.

## 2019-05-28 NOTE — PROGRESS NOTES
[facility] Hospital Medicine  Progress Note    Patient Name: Alissa Sadler  MRN: [id number]  Patient Class: IP- Inpatient   Admission Date: 5/27/2019  Length of Stay: 1 days  Attending Physician: Amador Ruffin MD  Primary Care Provider: Amador Gay MD        Subjective:     Principal Problem:Sepsis    HPI:  Alissa Sadler is a 57 yo female with a PMH breast CA, hairy cell leukemia and anemia who was sent to the ER from her PCPs office with an elevated heart rate. EKG in the ER showed sinus tach with HR in the 130's. Associated symptoms include a subjective fever, non-productive cough, generalized weakness and fatigue. The patient reports that her symptoms began 2 days ago. She denies any modifying factors. Patient denies chills, CP, pnd, orthopnea, palpitations, diaphoresis, headache, blurred vision, numbness, tingling, dizziness, localized weakness, n/v/d, abdominal pain, blood in stools, melena, hematemesis, urinary frequency, urgency, dysuria or hematuria. CXR showed silhouetting of the left border of the heart, characteristic a subtle pneumonia. The patient was found to have a WBC of 1.15. The patient is being admitted for treatment of PNA/Sepsis.         Hospital Course:  5/28/19 No acute issues overnight. The patient reports some improvement in symptoms but continues to report a non-productive cough. Platelets are 36K this AM. Will continue to monitor. Hem/onc following.     Interval History: No acute issues overnight. The patient reports some improvement in symptoms but continues to report a non-productive cough. Platelets are 36K this AM. Will continue to monitor. Hem/onc following.       Review of Systems   Constitutional: Positive for fever. Negative for activity change, appetite change, chills, diaphoresis, fatigue and unexpected weight change.        Generalized weakness and fatigue   HENT: Negative for congestion, drooling, facial swelling, rhinorrhea, sinus  pressure, sneezing and sore throat.    Eyes: Negative for discharge, redness, itching and visual disturbance.   Respiratory: Positive for cough and shortness of breath. Negative for apnea, choking, chest tightness, wheezing and stridor.    Cardiovascular: Negative for chest pain, palpitations and leg swelling.   Gastrointestinal: Negative for abdominal distention, abdominal pain, anal bleeding, blood in stool, constipation, diarrhea, nausea and vomiting.   Genitourinary: Negative for decreased urine volume, difficulty urinating, dysuria, frequency, hematuria, pelvic pain, urgency, vaginal bleeding and vaginal discharge.   Musculoskeletal: Negative for arthralgias, back pain, gait problem, joint swelling, myalgias, neck pain and neck stiffness.   Skin: Negative for color change, pallor, rash and wound.   Neurological: Negative for dizziness, seizures, facial asymmetry, speech difficulty, weakness, light-headedness, numbness and headaches.   Psychiatric/Behavioral: Negative for agitation, confusion, hallucinations and suicidal ideas.   All other systems reviewed and are negative.    Objective:     Vital Signs (Most Recent):  Temp: 96.8 °F (36 °C) (05/28/19 0708)  Pulse: 99 (05/28/19 0842)  Resp: 18 (05/28/19 0842)  BP: (!) 100/57 (05/28/19 0708)  SpO2: 97 % (05/28/19 0842) Vital Signs (24h Range):  Temp:  [96.8 °F (36 °C)-100.3 °F (37.9 °C)] 96.8 °F (36 °C)  Pulse:  [] 99  Resp:  [16-20] 18  SpO2:  [89 %-97 %] 97 %  BP: ()/(50-66) 100/57     Weight: 74.5 kg (164 lb 3.9 oz)  Body mass index is 24.25 kg/m².    Intake/Output Summary (Last 24 hours) at 5/28/2019 1256  Last data filed at 5/28/2019 0630  Gross per 24 hour   Intake 2606.25 ml   Output 650 ml   Net 1956.25 ml      Physical Exam   Constitutional: She is oriented to person, place, and time. She appears well-developed and well-nourished.   HENT:   Head: Normocephalic and atraumatic.   Eyes: Pupils are equal, round, and reactive to light. Conjunctivae  and EOM are normal.   Neck: Normal range of motion. Neck supple.   Cardiovascular: Regular rhythm, normal heart sounds and intact distal pulses.   No murmur heard.  Tachycardic   Pulmonary/Chest: Effort normal and breath sounds normal. No respiratory distress.   Abdominal: Soft. Bowel sounds are normal. She exhibits no distension. There is no tenderness.   Musculoskeletal: Normal range of motion. She exhibits no edema, tenderness or deformity.   Neurological: She is alert and oriented to person, place, and time. She has normal reflexes.   Skin: Skin is warm and dry. No erythema.   Psychiatric: She has a normal mood and affect. Her behavior is normal.   Nursing note and vitals reviewed.      Significant Labs: All pertinent labs within the past 24 hours have been reviewed.    Significant Imaging:   Imaging Results          X-Ray Chest AP Portable (Final result)  Result time 05/27/19 14:15:23    Final result by MARTÍNEZ Rao Sr., MD (05/27/19 14:15:23)                 Impression:      There has been interval silhouetting of the left border of the heart.  This is characteristic a subtle pneumonia..      Electronically signed by: Alex Rao MD  Date:    05/27/2019  Time:    14:15             Narrative:    EXAMINATION:  XR CHEST AP PORTABLE    CLINICAL HISTORY:  Sepsis;    COMPARISON:  08/23/2011    FINDINGS:  There has been interval silhouetting of the left border of the heart.  The right lung is clear.  There is no pneumothorax.  The costophrenic angles are sharp.                                  Assessment/Plan:      * Sepsis  - Blood cultures pending   - IV vanc/zosyn  - Lactic acid is negative  - Procalcitonin is 0.23  5/28/19 Platelets are 36K this AM. Will continue to monitor. Hem/onc following.       Pneumonia of left upper lobe due to infectious organism  - Blood cultures pending   - IV vanc/zosyn  - Neb tx   - Mucinex   - Anti-tussive PRN   - Acapella     Breast CA  - Hem/onc consulted  - Keep OP follow  up with primary Oncologist   5/28/19 Ok to hold tamoxifen    Pancytopenia  - Monitor and transfuse if symptomatic   - CBC in am   5/28/19 Platelets are 36K this AM. Will continue to monitor. Hem/onc following.       Hairy cell leukemia  - Hem/onc consulted  - Keep OP follow up with primary Oncologist   5/28/19 Ok to hold tamoxifen       VTE Risk Mitigation (From admission, onward)        Ordered     IP VTE LOW RISK PATIENT  Once      05/27/19 1730     Place sequential compression device  Until discontinued      05/27/19 1730              Maurilio Ramos NP  Department of Hospital Medicine   [facility]

## 2019-05-28 NOTE — ASSESSMENT & PLAN NOTE
Hairy cell leukemia diagnosed greater than 5 years ago without previous treatment.  Suspect that she will need treatment in the near future and she plans to follow up with Dr. padron after discharge to discuss treatment.

## 2019-05-28 NOTE — HOSPITAL COURSE
5/28/19 No acute issues overnight. The patient reports some improvement in symptoms but continues to report a non-productive cough. Platelets are 36K this AM. Will continue to monitor. Hem/onc following. 5/29/19 The patient spiked a temp of 101.8 overnight despite being on ABX, reports increased coughing. WBC decreased to 0.71. Hem/onc following. Will consult ID, the patient may need fungal coverage given immune compromised state. Repeat CXR today.     5/30 Pt was seen and examined at bedside . Last  Night she became more hypoxic  And  CT chest was done  Which show worse PNA. She received 2 lt of NS  Due to low BP  . The  BP  Cont borderline low . The HR  Is above 150 on sinus tachycardia . Pt lactic acid  Trend up to 2.5 . Pt was transferred to  for close monitor .     5/31 Pt was seen and examined at bedside . She cont on mechanical ventilation and vasopressor x 3 . The PH this am was < 7  And recived bicarb . The Blood cx is (+) for GPC.  She cont tachycardic .  She is on broad  spectrum IVAB ( vanc, meropenem  and clindamycin ) and antifungal IV . The blood pressure cont  With a MAP < 65 despite 3 vasopressor . Pt was started on hydrocortisone  100 mg iv qid     Significant event   CODE BLUE:  Code blue 14:10:00 -  14:38:00  Cardiac Rhythm PEA - bradycardia  ACLS protocol: Disconnected from ventilator: CPR for 15 minutes: Bag mask ventilation : Epinephrine X 4:   ROSC: No  Pateint pronounced at 14:26: 00   Family was Informed

## 2019-05-28 NOTE — PLAN OF CARE
Problem: Adult Inpatient Plan of Care  Goal: Plan of Care Review  Outcome: Ongoing (interventions implemented as appropriate)  POC reviewed, verbalized understanding. Pt remains free from falls, fall precautions in place. Pt is SR-ST on monitor with highest heart rate at 136 when pt up to bathroom, Temp of 100.3 at midnight vitals. O22L NC started for decreased sats. PRN meds given for pain. No other complaints at this time. PIV intact with NS infusing @75cc/min. ABX given per orders.Call bell and personal belongings within reach. Hourly rounding complete. Reminded to call for assistance. Will continue to monitor.

## 2019-05-28 NOTE — PLAN OF CARE
CM met with patient at the bedside to assess for discharge needs.  Patient lives at home with her  and is independent with all needs.  She does not anticipate any discharge needs at this time.  CM provided a transitional care folder, information on advanced directives, information on pharmacy bedside delivery, and discharge planning begins on admission with contact information for any needs/questions.    D/C Plan: Home  PCP: Dr Gay  Preferred Pharmacy: [facility] ([facility])  Discharge transportation: Spouse  My [facility]: Link sent  Pharmacy Bedside Delivery: No     05/28/19 5319   Discharge Assessment   Assessment Type Discharge Planning Assessment   Confirmed/corrected address and phone number on facesheet? Yes   Assessment information obtained from? Patient;Medical Record   Expected Length of Stay (days)   (TBD)   Communicated expected length of stay with patient/caregiver yes   Prior to hospitilization cognitive status: Alert/Oriented   Prior to hospitalization functional status: Independent   Current cognitive status: Alert/Oriented   Current Functional Status: Independent   Facility Arrived From: home   Lives With spouse   Able to Return to Prior Arrangements yes   Is patient able to care for self after discharge? Yes   Who are your caregiver(s) and their phone number(s)? Pete Pabonn, spouse [phone]   Patient's perception of discharge disposition home or selfcare   Readmission Within the Last 30 Days no previous admission in last 30 days   Patient currently being followed by outpatient case management? No   Patient currently receives any other outside agency services? No   Equipment Currently Used at Home none   Do you have any problems affording any of your prescribed medications? No   Is the patient taking medications as prescribed? yes   Does the patient have transportation home? Yes   Transportation Anticipated family or friend will provide   Dialysis Name and Scheduled days NA   Does the  patient receive services at the [facility]? No   Discharge Plan A Home with family   DME Needed Upon Discharge  none   Patient/Family in Agreement with Plan yes

## 2019-05-28 NOTE — ASSESSMENT & PLAN NOTE
- Monitor and transfuse if symptomatic   - CBC in am   5/28/19 Platelets are 36K this AM. Will continue to monitor. Hem/onc following.

## 2019-05-28 NOTE — HPI
58-year-old female with history of DCIS and hairy cell leukemia diagnosed in approximately 2012.  She has not required treatment, however, her counts have slowly declined over the previous 5 years.  She was sent to the emergency room from her primary care doctor with tachycardia/fever which had been present for the previous 48 hr.  Chest x-ray was consistent with pneumonia and she was admitted for pneumonia/sepsis

## 2019-05-28 NOTE — ASSESSMENT & PLAN NOTE
- Blood cultures pending   - IV vanc/zosyn  - Lactic acid is negative  - Procalcitonin is 0.23  5/28/19 Platelets are 36K this AM. Will continue to monitor. Hem/onc following.

## 2019-05-28 NOTE — ASSESSMENT & PLAN NOTE
Continue broad-spectrum antibiotics/supportive care.  Patient has severe neutropenia secondary to hairy cell leukemia which may slow recovery.  --continue to monitor closely  --supportive care/broad-spectrum antibiotic

## 2019-05-29 NOTE — PROGRESS NOTES
[facility] Hospital Medicine  Progress Note    Patient Name: Alissa Sadler  MRN: [id number]  Patient Class: IP- Inpatient   Admission Date: 5/27/2019  Length of Stay: 2 days  Attending Physician: Amador Ruffin MD  Primary Care Provider: Amador Gay MD        Subjective:     Principal Problem:Sepsis    HPI:  Alissa Sadler is a 57 yo female with a PMH breast CA, hairy cell leukemia and anemia who was sent to the ER from her PCPs office with an elevated heart rate. EKG in the ER showed sinus tach with HR in the 130's. Associated symptoms include a subjective fever, non-productive cough, generalized weakness and fatigue. The patient reports that her symptoms began 2 days ago. She denies any modifying factors. Patient denies chills, CP, pnd, orthopnea, palpitations, diaphoresis, headache, blurred vision, numbness, tingling, dizziness, localized weakness, n/v/d, abdominal pain, blood in stools, melena, hematemesis, urinary frequency, urgency, dysuria or hematuria. CXR showed silhouetting of the left border of the heart, characteristic a subtle pneumonia. The patient was found to have a WBC of 1.15. The patient is being admitted for treatment of PNA/Sepsis.         Hospital Course:  5/28/19 No acute issues overnight. The patient reports some improvement in symptoms but continues to report a non-productive cough. Platelets are 36K this AM. Will continue to monitor. Hem/onc following. 5/29/19 The patient spiked a temp of 101.8 overnight despite being on ABX, reports increased coughing. WBC decreased to 0.71. Hem/onc following. Will consult ID, the patient may need fungal coverage given immune compromised state. Repeat CXR today.     Interval History:  The patient spiked a temp of 101.8 overnight despite being on ABX, reports increased coughing. WBC decreased to 0.71. Hem/onc following. Will consult ID, the patient may need fungal coverage given immune compromised state. Repeat CXR today.      Review of Systems   Constitutional: Positive for fever. Negative for activity change, appetite change, chills, diaphoresis, fatigue and unexpected weight change.        Generalized weakness and fatigue   HENT: Negative for congestion, drooling, facial swelling, rhinorrhea, sinus pressure, sneezing and sore throat.    Eyes: Negative for discharge, redness, itching and visual disturbance.   Respiratory: Positive for cough and shortness of breath. Negative for apnea, choking, chest tightness, wheezing and stridor.    Cardiovascular: Negative for chest pain, palpitations and leg swelling.   Gastrointestinal: Negative for abdominal distention, abdominal pain, anal bleeding, blood in stool, constipation, diarrhea, nausea and vomiting.   Genitourinary: Negative for decreased urine volume, difficulty urinating, dysuria, frequency, hematuria, pelvic pain, urgency, vaginal bleeding and vaginal discharge.   Musculoskeletal: Negative for arthralgias, back pain, gait problem, joint swelling, myalgias, neck pain and neck stiffness.   Skin: Negative for color change, pallor, rash and wound.   Neurological: Negative for dizziness, seizures, facial asymmetry, speech difficulty, weakness, light-headedness, numbness and headaches.   Psychiatric/Behavioral: Negative for agitation, confusion, hallucinations and suicidal ideas.   All other systems reviewed and are negative.    Objective:     Vital Signs (Most Recent):  Temp: 99.4 °F (37.4 °C) (05/29/19 1154)  Pulse: (!) 133 (05/29/19 1154)  Resp: 18 (05/29/19 1154)  BP: (!) 103/57 (05/29/19 1154)  SpO2: (!) 92 % (05/29/19 1154) Vital Signs (24h Range):  Temp:  [98.3 °F (36.8 °C)-101.9 °F (38.8 °C)] 99.4 °F (37.4 °C)  Pulse:  [] 133  Resp:  [14-20] 18  SpO2:  [90 %-98 %] 92 %  BP: ()/(54-57) 103/57     Weight: 74.5 kg (164 lb 3.9 oz)  Body mass index is 24.25 kg/m².    Intake/Output Summary (Last 24 hours) at 5/29/2019 1244  Last data filed at 5/29/2019 0654  Gross per 24  hour   Intake 3287.5 ml   Output 2600 ml   Net 687.5 ml      Physical Exam   Constitutional: She is oriented to person, place, and time. She appears well-developed and well-nourished.   HENT:   Head: Normocephalic and atraumatic.   Eyes: Pupils are equal, round, and reactive to light. Conjunctivae and EOM are normal.   Neck: Normal range of motion. Neck supple.   Cardiovascular: Regular rhythm, normal heart sounds and intact distal pulses.   No murmur heard.  Tachycardic   Pulmonary/Chest: Effort normal and breath sounds normal. No respiratory distress.   Abdominal: Soft. Bowel sounds are normal. She exhibits no distension. There is no tenderness.   Musculoskeletal: Normal range of motion. She exhibits no edema, tenderness or deformity.   Neurological: She is alert and oriented to person, place, and time. She has normal reflexes.   Skin: Skin is warm and dry. No erythema.   Psychiatric: She has a normal mood and affect. Her behavior is normal.   Nursing note and vitals reviewed.      Significant Labs: All pertinent labs within the past 24 hours have been reviewed.    Significant Imaging:   Imaging Results          X-Ray Chest AP Portable (Final result)  Result time 05/27/19 14:15:23    Final result by MARTÍNEZ Rao Sr., MD (05/27/19 14:15:23)                 Impression:      There has been interval silhouetting of the left border of the heart.  This is characteristic a subtle pneumonia..      Electronically signed by: Alex Rao MD  Date:    05/27/2019  Time:    14:15             Narrative:    EXAMINATION:  XR CHEST AP PORTABLE    CLINICAL HISTORY:  Sepsis;    COMPARISON:  08/23/2011    FINDINGS:  There has been interval silhouetting of the left border of the heart.  The right lung is clear.  There is no pneumothorax.  The costophrenic angles are sharp.                                  Assessment/Plan:      * Sepsis  - Blood cultures pending   - IV vanc/zosyn  - Lactic acid is negative  - Procalcitonin is  0.23  5/28/19 Platelets are 36K this AM. Will continue to monitor. Hem/onc following.   5/29/19  The patient spiked a temp of 101.8 overnight despite being on ABX, reports increased coughing. WBC decreased to 0.71. Hem/onc following. Will consult ID, the patient may need fungal coverage given immune compromised state. Repeat CXR today.     Pneumonia of left upper lobe due to infectious organism  - Blood cultures pending   - IV vanc/zosyn  - Neb tx   - Mucinex   - Anti-tussive PRN   - Acapella   5/29/19  The patient spiked a temp of 101.8 overnight despite being on ABX, reports increased coughing. WBC decreased to 0.71. Hem/onc following. Will consult ID, the patient may need fungal coverage given immune compromised state. Repeat CXR today.     Breast CA  - Hem/onc consulted  - Keep OP follow up with primary Oncologist   5/28/19 Ok to hold tamoxifen    Pancytopenia  - Monitor and transfuse if symptomatic   - CBC in am   5/28/19 Platelets are 36K this AM. Will continue to monitor. Hem/onc following.    5/29/19 WBC decreased to 0.71. Hem/onc following.  Plts 37K    Hairy cell leukemia  - Hem/onc consulted  - Keep OP follow up with primary Oncologist   5/28/19 Ok to hold tamoxifen       VTE Risk Mitigation (From admission, onward)        Ordered     IP VTE LOW RISK PATIENT  Once      05/27/19 1730     Place sequential compression device  Until discontinued      05/27/19 1730              Maurilio Ramos NP  Department of Hospital Medicine   [facility]

## 2019-05-29 NOTE — ASSESSMENT & PLAN NOTE
- Monitor and transfuse if symptomatic   - CBC in am   5/28/19 Platelets are 36K this AM. Will continue to monitor. Hem/onc following.    5/29/19 WBC decreased to 0.71. Hem/onc following.  Plts 37K

## 2019-05-29 NOTE — ASSESSMENT & PLAN NOTE
- Blood cultures pending   - IV vanc/zosyn  - Neb tx   - Mucinex   - Anti-tussive PRN   - Acapella   5/29/19  The patient spiked a temp of 101.8 overnight despite being on ABX, reports increased coughing. WBC decreased to 0.71. Hem/onc following. Will consult ID, the patient may need fungal coverage given immune compromised state. Repeat CXR today.

## 2019-05-29 NOTE — PROGRESS NOTES
[facility] -   Hematology/Oncology  Progress Note    Patient Name: Alissa Sadler  Admission Date: 5/27/2019  Hospital Length of Stay: 2 days  Code Status: Full Code     Subjective:     HPI:      58-year-old female with history of DCIS and hairy cell leukemia diagnosed in approximately 2012.  She has not required treatment, however, her counts have slowly declined over the previous 5 years.  She was sent to the emergency room from her primary care doctor with tachycardia/fever which had been present for the previous 48 hr.  Chest x-ray was consistent with pneumonia and she was admitted for pneumonia/sepsis    Interval History: Patient still with fever spikes on IV Vanc/Zoysn. T max last 24 H 101.9. ID consult today.      Oncology Treatment Plan:   [No treatment plan]    Medications:  Continuous Infusions:   sodium chloride 0.9% 75 mL/hr at 05/28/19 2248     Scheduled Meds:   dextromethorphan-guaifenesin  mg  1 tablet Oral BID    piperacillin-tazobactam (ZOSYN) IVPB  4.5 g Intravenous Q8H    vancomycin (VANCOCIN) IVPB  1,250 mg Intravenous Q12H     PRN Meds:acetaminophen, albuterol-ipratropium, ketorolac, ondansetron, promethazine-codeine 6.25-10 mg/5 ml, sodium chloride 0.9%     Review of patient's allergies indicates:   Allergen Reactions    Adhesive Rash        Past Medical History:   Diagnosis Date    Brain tumor     Hairy cell leukemia 2016     Past Surgical History:   Procedure Laterality Date    APPENDECTOMY      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 Negative for  appetite change, chills and diaphoresis.   HENT: Negative for congestion, dental problem, drooling, ear discharge, facial swelling, mouth sores, nosebleeds, sinus pressure and sinus pain.    Eyes: Negative for photophobia, pain, discharge, itching and visual disturbance.   Respiratory: Positive for cough and shortness of breath. Negative for apnea, choking, chest tightness, wheezing and stridor.    Cardiovascular: Positive for palpitations. Negative for chest pain and leg swelling.   Gastrointestinal: Negative for abdominal distention, abdominal pain, anal bleeding, blood in stool, constipation, diarrhea, nausea and vomiting.   Endocrine: Negative for cold intolerance, heat intolerance, polydipsia and polyphagia.   Genitourinary: Negative for difficulty urinating, dyspareunia, dysuria, flank pain, frequency, pelvic pain and vaginal bleeding.   Musculoskeletal: Positive for myalgias. Negative for arthralgias, back pain, gait problem, joint swelling and neck pain.   Skin: Negative for pallor, rash and wound.   Allergic/Immunologic: Negative for environmental allergies and immunocompromised state.   Neurological: Negative for dizziness, tremors, seizures, facial asymmetry, speech difficulty, light-headedness, numbness and headaches.   Hematological: Negative for adenopathy. Does not bruise/bleed easily.   Psychiatric/Behavioral: Negative for agitation, behavioral problems, confusion, dysphoric mood and hallucinations. The patient is not nervous/anxious and is not hyperactive.      Objective:     Vital Signs (Most Recent):  Temp: 99.7 °F (37.6 °C) (05/29/19 0728)  Pulse: (!) 115 (05/29/19 0925)  Resp: 20 (05/29/19 0730)  BP: (!) 93/56 (05/29/19 0728)  SpO2: (!) 92 % (05/29/19 0730) Vital Signs (24h Range):  Temp:  [98.3 °F (36.8 °C)-101.9 °F (38.8 °C)] 99.7 °F (37.6 °C)  Pulse:  [] 115  Resp:  [14-20] 20  SpO2:  [90 %-98 %] 92 %  BP: ()/(54-57) 93/56     Weight: 74.5 kg (164 lb 3.9 oz)  Body mass index is  24.25 kg/m².  Body surface area is 1.9 meters squared.      Intake/Output Summary (Last 24 hours) at 5/29/2019 1123  Last data filed at 5/29/2019 0654  Gross per 24 hour   Intake 3287.5 ml   Output 2600 ml   Net 687.5 ml       Physical Exam   Constitutional: She is oriented to person, place, and time. She appears well-developed and well-nourished. No distress.   Well groomed   HENT:   Head: Normocephalic and atraumatic.   Right Ear: External ear normal.   Left Ear: External ear normal.   Nose: Nose normal.   Mouth/Throat: Oropharynx is clear and moist. No oropharyngeal exudate.   Eyes: Pupils are equal, round, and reactive to light. Conjunctivae and EOM are normal. Right eye exhibits no discharge. Left eye exhibits no discharge.   Neck: Normal range of motion. Neck supple. No tracheal deviation present. No thyromegaly present.   Cardiovascular: Normal rate, regular rhythm, normal heart sounds and intact distal pulses. Exam reveals no gallop and no friction rub.   No murmur heard.  Pulmonary/Chest: Effort normal and breath sounds normal. She has no wheezes. She has no rales. She exhibits no tenderness.   Abdominal: Soft. Bowel sounds are normal. She exhibits no distension. There is no tenderness. There is no guarding.   Musculoskeletal: Normal range of motion. She exhibits no edema or tenderness.   Neurological: She is alert and oriented to person, place, and time. She displays normal reflexes. No cranial nerve deficit or sensory deficit. Coordination normal.   Skin: Skin is warm, dry and intact. Capillary refill takes less than 2 seconds. No abrasion, no bruising and no rash noted. No cyanosis. Nails show no clubbing.   Psychiatric: She has a normal mood and affect. Her behavior is normal. Judgment and thought content normal.       Significant Labs:   CBC:   Recent Labs   Lab 05/27/19  1410 05/28/19  0542 05/29/19  0310   WBC 1.15* 1.06* 0.71*   HGB 10.7* 8.8* 8.6*   HCT 33.0* 26.7* 26.0*   PLT 42* 36* 37*   , CMP:    Recent Labs   Lab 05/27/19  1410 05/28/19  0542 05/29/19  0310    137 138   K 4.1 3.5 3.4*    106 106   CO2 23 25 22*    154* 122*   BUN 14 10 10   CREATININE 0.9 0.8 0.8   CALCIUM 8.8 8.3* 8.2*   PROT 6.3  --   --    ALBUMIN 3.0*  --   --    BILITOT 1.3*  --   --    ALKPHOS 70  --   --    AST 13  --   --    ALT 14  --   --    ANIONGAP 12 6* 10   EGFRNONAA >60 >60 >60   , Coagulation:   Recent Labs   Lab 05/27/19  1410   INR 1.0   APTT 23.4   , Haptoglobin: No results for input(s): HAPTOGLOBIN in the last 48 hours., Immunology: No results for input(s): SPEP, PARMJIT, INDER, FREELAMBDALI in the last 48 hours., LDH: No results for input(s): LDHCSF, BFSOURCE in the last 48 hours. and LFTs:   Recent Labs   Lab 05/27/19  1410   ALT 14   AST 13   ALKPHOS 70   BILITOT 1.3*   PROT 6.3   ALBUMIN 3.0*       Diagnostic Results:  I have reviewed all pertinent imaging results/findings within the past 24 hours.    Assessment/Plan:     Pancytopenia  Pancytopenia secondary to hairy cell leukemia/sepsis:  --continue to monitor and transfuse irradiated products as needed      Hairy cell leukemia  Hairy cell leukemia diagnosed greater than 5 years ago without previous treatment.  Suspect that she will need treatment in the near future and she plans to follow up with Dr. padron after discharge to discuss treatment.    Pneumonia of left upper lobe due to infectious organism  Continue broad-spectrum antibiotics/supportive care.  Patient has severe neutropenia secondary to hairy cell leukemia which may slow recovery.  --continue to monitor closely  --supportive care/broad-spectrum antibiotic    May 29, 2019  --WBC 0.71, trending downward  --Still with fever spikes on IV Vanc/Zoysn. Tmax last 24 H 101.9  -- ID consult  --Sputum cx. Follow results  --Continue broad spectrum antibiotics  --Consider adding antifungal coverage if ok with ID  --Continue supportive care          Thank you for your consult. I will follow-up with  patient. Please contact us if you have any additional questions.     Phylicia Chung NP  Hematology/Oncology  [facility] [place]

## 2019-05-29 NOTE — PROGRESS NOTES
Pharmacokinetic Assessment Follow Up: IV Vancomycin    Vancomycin serum concentration assessment(s):    The trough level was drawn correctly and can be used to guide therapy at this time. It was actually about 45 minutes early and the trough level was lower than anticipated at 13.3 mcg/mL     Vancomycin Regimen Plan:    Change regimen to Vancomycin 1250 mg IV every 12 hour with next serum trough concentration measured at 1600 prior to 3rd dose on 05/30  This new regimen will begin with today's 1630 dose.    Pharmacy will continue to follow and monitor vancomycin.    Please contact pharmacy at extension [phone] for questions regarding this assessment.    Thank you for the consult,   Navi Chamberlain     Patient brief summary:  Alissa Sadler is a 58 y.o. female initiated on antimicrobial therapy with IV Vancomycin for treatment of suspected lower respiratory infection    The patient received a loading dose, followed by the current treatment regimen: vancomycin 1000 mg IV every 12 hours    Drug Allergies:   Review of patient's allergies indicates:   Allergen Reactions    Adhesive Rash       Actual Body Weight:   74.5 kg    Renal Function:   Estimated Creatinine Clearance: 82.1 mL/min (based on SCr of 0.8 mg/dL).,     Dialysis Method (if applicable):  N/A     CBC (last 72 hours):  Recent Labs   Lab Result Units 05/27/19  1410 05/28/19  0542 05/29/19  0310   WBC K/uL 1.15* 1.06* 0.71*   Hemoglobin g/dL 10.7* 8.8* 8.6*   Hematocrit % 33.0* 26.7* 26.0*   Platelets K/uL 42* 36* 37*   Gran% % 23.0* 19.8* 21.2*   Lymph% % 62.0* 68.9* 73.2*   Mono% % 15.0 11.3 7.0   Eosinophil% % 0.0 0.0 0.0   Basophil% % 0.0 0.0 1.4   Differential Method  Manual Automated Automated       Metabolic Panel (last 72 hours):  Recent Labs   Lab Result Units 05/27/19  1410 05/27/19  1920 05/28/19  0542 05/29/19  0310   Sodium mmol/L 139  --  137 138   Potassium mmol/L 4.1  --  3.5 3.4*   Chloride mmol/L 104  --  106 106   CO2 mmol/L 23  --  25  22*   Glucose mg/dL 110  --  154* 122*   Glucose, UA   --  Negative  --   --    BUN, Bld mg/dL 14  --  10 10   Creatinine mg/dL 0.9  --  0.8 0.8   Albumin g/dL 3.0*  --   --   --    Total Bilirubin mg/dL 1.3*  --   --   --    Alkaline Phosphatase U/L 70  --   --   --    AST U/L 13  --   --   --    ALT U/L 14  --   --   --        Vancomycin Administrations:  vancomycin given in the last 96 hours                     vancomycin in dextrose 5 % 1 gram/250 mL IVPB 1,000 mg (mg) 1,000 mg New Bag 05/29/19 0434     1,000 mg New Bag 05/28/19 1724     1,000 mg New Bag  0443    vancomycin (VANCOCIN) 2,250 mg in dextrose 5 % 500 mL IVPB (mg) 2,250 mg New Bag 05/27/19 1625                      Drug levels (last 3 results):  Recent Labs   Lab Result Units 05/29/19  0310   Vancomycin-Trough ug/mL 13.3       Microbiologic Results:  Microbiology Results (last 7 days)       Procedure Component Value Units Date/Time    Blood culture x two cultures. Draw prior to antibiotics. [[id number]] Collected:  05/27/19 1420    Order Status:  Completed Specimen:  Blood from Peripheral, Wrist, Left Updated:  05/28/19 2212     Blood Culture, Routine No Growth to date     Blood Culture, Routine No Growth to date    Narrative:       Aerobic and anaerobic    Blood culture x two cultures. Draw prior to antibiotics. [[id number]] Collected:  05/27/19 1410    Order Status:  Completed Specimen:  Blood from Peripheral, Antecubital, Left Updated:  05/28/19 2212     Blood Culture, Routine No Growth to date     Blood Culture, Routine No Growth to date    Narrative:       Aerobic and anaerobic

## 2019-05-29 NOTE — PLAN OF CARE
Problem: Adult Inpatient Plan of Care  Goal: Plan of Care Review  Outcome: Ongoing (interventions implemented as appropriate)  Discussed Plan of Care with patient and verbalized understanding - Patient remains AAOx4 - remains free of falls, accidents and trauma during the day shift. Bed is in the low position and the call light is within reach. Sputum to be collected when patient able, Infectious Disease has been consulted and started her on another antibiotic.  Will continue to monitor

## 2019-05-29 NOTE — PLAN OF CARE
Problem: Adult Inpatient Plan of Care  Goal: Plan of Care Review  Outcome: Ongoing (interventions implemented as appropriate)  POC reviewed, verbalized understanding. Pt remains free from falls, fall precautions in place. Pt is SR-ST on monitor, Temp 101.8, tylenol given. VS monitored, NP aware. PRN meds given for c/o cough. No other complaints at this time. PIV intact with NS @ 75. ABX given per orders. Call bell and personal belongings within reach. Hourly rounding complete. Reminded to call for assistance. Will continue to monitor.

## 2019-05-29 NOTE — PROGRESS NOTES
05/29/19 0330   Critical Value Communication   Name of Notified Physician/Designee suma black np   Date Result Received 05/29/19   Time Result Received 0325   Resulting Department of Critical Value lab   Who communicated critical value from resulting department? lab   Critical Test #1 wbc   Critical Test #1 Result 0.71   Critical Test #2 plts   Critical Test #2 Result 37   Date Notified 05/29/19   Time Notified 0330   Read Back Verification Yes   Physician Directive no new orders given

## 2019-05-29 NOTE — SUBJECTIVE & OBJECTIVE
Interval History: Patient still with fever spikes on IV Vanc/Zoysn. T max last 24 H 101.9. ID consult today.      Oncology Treatment Plan:   [No treatment plan]    Medications:  Continuous Infusions:   sodium chloride 0.9% 75 mL/hr at 05/28/19 2248     Scheduled Meds:   dextromethorphan-guaifenesin  mg  1 tablet Oral BID    piperacillin-tazobactam (ZOSYN) IVPB  4.5 g Intravenous Q8H    vancomycin (VANCOCIN) IVPB  1,250 mg Intravenous Q12H     PRN Meds:acetaminophen, albuterol-ipratropium, ketorolac, ondansetron, promethazine-codeine 6.25-10 mg/5 ml, sodium chloride 0.9%     Review of patient's allergies indicates:   Allergen Reactions    Adhesive Rash        Past Medical History:   Diagnosis Date    Brain tumor     Hairy cell leukemia 2016     Past Surgical History:   Procedure Laterality Date    APPENDECTOMY      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 Negative for appetite change, chills and diaphoresis.   HENT: Negative for congestion, dental problem, drooling, ear discharge, facial swelling, mouth sores, nosebleeds, sinus pressure and sinus pain.    Eyes: Negative for photophobia, pain, discharge, itching and visual disturbance.   Respiratory: Positive for cough and shortness of breath. Negative for apnea, choking, chest tightness, wheezing and stridor.    Cardiovascular: Positive for palpitations. Negative for chest pain and leg swelling.   Gastrointestinal: Negative for abdominal distention, abdominal pain, anal bleeding, blood in stool, constipation, diarrhea, nausea and vomiting.   Endocrine:  Negative for cold intolerance, heat intolerance, polydipsia and polyphagia.   Genitourinary: Negative for difficulty urinating, dyspareunia, dysuria, flank pain, frequency, pelvic pain and vaginal bleeding.   Musculoskeletal: Positive for myalgias. Negative for arthralgias, back pain, gait problem, joint swelling and neck pain.   Skin: Negative for pallor, rash and wound.   Allergic/Immunologic: Negative for environmental allergies and immunocompromised state.   Neurological: Negative for dizziness, tremors, seizures, facial asymmetry, speech difficulty, light-headedness, numbness and headaches.   Hematological: Negative for adenopathy. Does not bruise/bleed easily.   Psychiatric/Behavioral: Negative for agitation, behavioral problems, confusion, dysphoric mood and hallucinations. The patient is not nervous/anxious and is not hyperactive.      Objective:     Vital Signs (Most Recent):  Temp: 99.7 °F (37.6 °C) (05/29/19 0728)  Pulse: (!) 115 (05/29/19 0925)  Resp: 20 (05/29/19 0730)  BP: (!) 93/56 (05/29/19 0728)  SpO2: (!) 92 % (05/29/19 0730) Vital Signs (24h Range):  Temp:  [98.3 °F (36.8 °C)-101.9 °F (38.8 °C)] 99.7 °F (37.6 °C)  Pulse:  [] 115  Resp:  [14-20] 20  SpO2:  [90 %-98 %] 92 %  BP: ()/(54-57) 93/56     Weight: 74.5 kg (164 lb 3.9 oz)  Body mass index is 24.25 kg/m².  Body surface area is 1.9 meters squared.      Intake/Output Summary (Last 24 hours) at 5/29/2019 1123  Last data filed at 5/29/2019 0654  Gross per 24 hour   Intake 3287.5 ml   Output 2600 ml   Net 687.5 ml       Physical Exam   Constitutional: She is oriented to person, place, and time. She appears well-developed and well-nourished. No distress.   Well groomed   HENT:   Head: Normocephalic and atraumatic.   Right Ear: External ear normal.   Left Ear: External ear normal.   Nose: Nose normal.   Mouth/Throat: Oropharynx is clear and moist. No oropharyngeal exudate.   Eyes: Pupils are equal, round, and reactive to light.  Conjunctivae and EOM are normal. Right eye exhibits no discharge. Left eye exhibits no discharge.   Neck: Normal range of motion. Neck supple. No tracheal deviation present. No thyromegaly present.   Cardiovascular: Normal rate, regular rhythm, normal heart sounds and intact distal pulses. Exam reveals no gallop and no friction rub.   No murmur heard.  Pulmonary/Chest: Effort normal and breath sounds normal. She has no wheezes. She has no rales. She exhibits no tenderness.   Abdominal: Soft. Bowel sounds are normal. She exhibits no distension. There is no tenderness. There is no guarding.   Musculoskeletal: Normal range of motion. She exhibits no edema or tenderness.   Neurological: She is alert and oriented to person, place, and time. She displays normal reflexes. No cranial nerve deficit or sensory deficit. Coordination normal.   Skin: Skin is warm, dry and intact. Capillary refill takes less than 2 seconds. No abrasion, no bruising and no rash noted. No cyanosis. Nails show no clubbing.   Psychiatric: She has a normal mood and affect. Her behavior is normal. Judgment and thought content normal.       Significant Labs:   CBC:   Recent Labs   Lab 05/27/19  1410 05/28/19  0542 05/29/19  0310   WBC 1.15* 1.06* 0.71*   HGB 10.7* 8.8* 8.6*   HCT 33.0* 26.7* 26.0*   PLT 42* 36* 37*   , CMP:   Recent Labs   Lab 05/27/19  1410 05/28/19  0542 05/29/19  0310    137 138   K 4.1 3.5 3.4*    106 106   CO2 23 25 22*    154* 122*   BUN 14 10 10   CREATININE 0.9 0.8 0.8   CALCIUM 8.8 8.3* 8.2*   PROT 6.3  --   --    ALBUMIN 3.0*  --   --    BILITOT 1.3*  --   --    ALKPHOS 70  --   --    AST 13  --   --    ALT 14  --   --    ANIONGAP 12 6* 10   EGFRNONAA >60 >60 >60   , Coagulation:   Recent Labs   Lab 05/27/19  1410   INR 1.0   APTT 23.4   , Haptoglobin: No results for input(s): HAPTOGLOBIN in the last 48 hours., Immunology: No results for input(s): SPEP, PARMJIT, INDER, FREELAMBDALI in the last 48 hours., LDH: No  results for input(s): LDHCSF, BFSOURCE in the last 48 hours. and LFTs:   Recent Labs   Lab 05/27/19  1410   ALT 14   AST 13   ALKPHOS 70   BILITOT 1.3*   PROT 6.3   ALBUMIN 3.0*       Diagnostic Results:  I have reviewed all pertinent imaging results/findings within the past 24 hours.

## 2019-05-29 NOTE — NURSING
ALYSON Moise notified of VS mews score. Pt was reassessed, vitals updated. PRN medication tylenol given. Pt on 2L O2 NC. Okay with sats as long as they do not drop below 92% per alyson moise. Will continue to monitor vitals.

## 2019-05-29 NOTE — ASSESSMENT & PLAN NOTE
- Blood cultures pending   - IV vanc/zosyn  - Lactic acid is negative  - Procalcitonin is 0.23  5/28/19 Platelets are 36K this AM. Will continue to monitor. Hem/onc following.   5/29/19  The patient spiked a temp of 101.8 overnight despite being on ABX, reports increased coughing. WBC decreased to 0.71. Hem/onc following. Will consult ID, the patient may need fungal coverage given immune compromised state. Repeat CXR today.

## 2019-05-29 NOTE — SUBJECTIVE & OBJECTIVE
Interval History:  The patient spiked a temp of 101.8 overnight despite being on ABX, reports increased coughing. WBC decreased to 0.71. Hem/onc following. Will consult ID, the patient may need fungal coverage given immune compromised state. Repeat CXR today.     Review of Systems   Constitutional: Positive for fever. Negative for activity change, appetite change, chills, diaphoresis, fatigue and unexpected weight change.        Generalized weakness and fatigue   HENT: Negative for congestion, drooling, facial swelling, rhinorrhea, sinus pressure, sneezing and sore throat.    Eyes: Negative for discharge, redness, itching and visual disturbance.   Respiratory: Positive for cough and shortness of breath. Negative for apnea, choking, chest tightness, wheezing and stridor.    Cardiovascular: Negative for chest pain, palpitations and leg swelling.   Gastrointestinal: Negative for abdominal distention, abdominal pain, anal bleeding, blood in stool, constipation, diarrhea, nausea and vomiting.   Genitourinary: Negative for decreased urine volume, difficulty urinating, dysuria, frequency, hematuria, pelvic pain, urgency, vaginal bleeding and vaginal discharge.   Musculoskeletal: Negative for arthralgias, back pain, gait problem, joint swelling, myalgias, neck pain and neck stiffness.   Skin: Negative for color change, pallor, rash and wound.   Neurological: Negative for dizziness, seizures, facial asymmetry, speech difficulty, weakness, light-headedness, numbness and headaches.   Psychiatric/Behavioral: Negative for agitation, confusion, hallucinations and suicidal ideas.   All other systems reviewed and are negative.    Objective:     Vital Signs (Most Recent):  Temp: 99.4 °F (37.4 °C) (05/29/19 1154)  Pulse: (!) 133 (05/29/19 1154)  Resp: 18 (05/29/19 1154)  BP: (!) 103/57 (05/29/19 1154)  SpO2: (!) 92 % (05/29/19 1154) Vital Signs (24h Range):  Temp:  [98.3 °F (36.8 °C)-101.9 °F (38.8 °C)] 99.4 °F (37.4 °C)  Pulse:   [] 133  Resp:  [14-20] 18  SpO2:  [90 %-98 %] 92 %  BP: ()/(54-57) 103/57     Weight: 74.5 kg (164 lb 3.9 oz)  Body mass index is 24.25 kg/m².    Intake/Output Summary (Last 24 hours) at 5/29/2019 1244  Last data filed at 5/29/2019 0654  Gross per 24 hour   Intake 3287.5 ml   Output 2600 ml   Net 687.5 ml      Physical Exam   Constitutional: She is oriented to person, place, and time. She appears well-developed and well-nourished.   HENT:   Head: Normocephalic and atraumatic.   Eyes: Pupils are equal, round, and reactive to light. Conjunctivae and EOM are normal.   Neck: Normal range of motion. Neck supple.   Cardiovascular: Regular rhythm, normal heart sounds and intact distal pulses.   No murmur heard.  Tachycardic   Pulmonary/Chest: Effort normal and breath sounds normal. No respiratory distress.   Abdominal: Soft. Bowel sounds are normal. She exhibits no distension. There is no tenderness.   Musculoskeletal: Normal range of motion. She exhibits no edema, tenderness or deformity.   Neurological: She is alert and oriented to person, place, and time. She has normal reflexes.   Skin: Skin is warm and dry. No erythema.   Psychiatric: She has a normal mood and affect. Her behavior is normal.   Nursing note and vitals reviewed.      Significant Labs: All pertinent labs within the past 24 hours have been reviewed.    Significant Imaging:   Imaging Results          X-Ray Chest AP Portable (Final result)  Result time 05/27/19 14:15:23    Final result by MARTÍNEZ Rao Sr., MD (05/27/19 14:15:23)                 Impression:      There has been interval silhouetting of the left border of the heart.  This is characteristic a subtle pneumonia..      Electronically signed by: Alex Rao MD  Date:    05/27/2019  Time:    14:15             Narrative:    EXAMINATION:  XR CHEST AP PORTABLE    CLINICAL HISTORY:  Sepsis;    COMPARISON:  08/23/2011    FINDINGS:  There has been interval silhouetting of the left  border of the heart.  The right lung is clear.  There is no pneumothorax.  The costophrenic angles are sharp.

## 2019-05-30 PROBLEM — D70.8 OTHER NEUTROPENIA: Chronic | Status: ACTIVE | Noted: 2019-01-01

## 2019-05-30 PROBLEM — R57.9 SHOCK CIRCULATORY: Status: ACTIVE | Noted: 2019-01-01

## 2019-05-30 PROBLEM — D84.9 IMMUNOCOMPROMISED, ACQUIRED: Status: ACTIVE | Noted: 2019-01-01

## 2019-05-30 PROBLEM — J96.01 ACUTE HYPOXEMIC RESPIRATORY FAILURE: Status: ACTIVE | Noted: 2019-01-01

## 2019-05-30 PROBLEM — E44.0 MODERATE MALNUTRITION: Status: ACTIVE | Noted: 2019-01-01

## 2019-05-30 PROBLEM — R65.20 SEVERE SEPSIS WITH ACUTE ORGAN DYSFUNCTION: Status: ACTIVE | Noted: 2019-01-01

## 2019-05-30 NOTE — PROGRESS NOTES
Pt arrived to room via bed, pt took steps to bed independently.  Pt RR 30-45, sats on vapotherm 35L 65% 80s.  Bedside report given by Jaz GUERRERO.  Heart monitor in place, vital signs taken and stable, no questions or concerns at this time.  Patient is aaox4 with SOB noted. Patient has been orientated to room, fall precautions, rounding sheet, menu, and board.  Personal belongings, cell phone and call bell within reach.  Pt stated she updated her  on being transferred to ICU.  Pt reminded to call for assistance. Bed in lowest position, bed alarm on and locked in place. Pt c/o upper back pain not radiating anywhere.  Will notify MD of arrival.

## 2019-05-30 NOTE — ASSESSMENT & PLAN NOTE
- Blood cultures pending   - IV vanc/zosyn  - Neb tx   - Mucinex   - Anti-tussive PRN   - Acapella   5/29/19  The patient spiked a temp of 101.8 overnight despite being on ABX, reports increased coughing. WBC decreased to 0.71. Hem/onc following. Will consult ID, the patient may need fungal coverage given immune compromised state. Repeat CXR today.     -CT chest show Left upper and left lower lobe pneumonia.  There is appears to be a small focal nodular infiltrate in the posterior aspect of the right upper lobe  - IVAB was changed to meropenem

## 2019-05-30 NOTE — HPI
58 year old female who  has a past medical history of Brain tumor, breast cancer, hairy cell leukemia (2016), immune compromise, admitted with severe sepsis secondary to lobar pneumonia.

## 2019-05-30 NOTE — ASSESSMENT & PLAN NOTE
Malnutrition in the context of Chronic Illness/Injury    Related to (etiology):  Inadequate energy intake     Signs and Symptoms (as evidenced by):  Energy Intake: <50% of estimated energy requirement for > 1 week  Body Fat Depletion: mild depletion of orbitals, triceps and thoracic and lumbar region   Muscle Mass Depletion: mild depletion of temples, clavicle region and scapular region     Interventions/Recommendations (treatment strategy):  See above     Nutrition Diagnosis Status:  New

## 2019-05-30 NOTE — CONSULTS
"  [facility]  Adult Nutrition  Consult Note    SUMMARY     Recommendations    Recommendation/Intervention: 1. Rec TF of Peptamen Intense VHP @ 50 ml/hr with flushes as needed with medications and additional per MD/NP for hydration. With current propofol infusion, provides 1736 calories, 111 g protein, and 1008 ml free water. 2. Will monitor propofol infusion and adjust recs prn.    Intervention: Coordination of care   Goals: Meet > 85 % EEN/EPN while admitted  Nutrition Goal Status: new  Communication of RD Recs: (Reviewed with MD, POC)    Reason for Assessment    Reason For Assessment: consult   Dx: Pneumonia of left upper lobe due to infectious organism, Tachycardia, Sepsis   Hx: Brain tumor, Hairy cell leukemia.   Interdisciplinary rounds: attended  General info comments: Pt w/ PMHx of Breast CA & Hairy cell leukemia. Admitted w/ elevated heart rate.  Pt currently in ICU, intubated and sedated w/ propofol. Per pt's family, pt w/ poor appetite and intake PTA (PO intake < 50 % x > 1 week). Pt's family reports no recent wt loss. Per epic records, last wt recorded 147 lbs on 1/15/15. Per [facility] 5/30/19, mild subcutaneous fat and muscle loss. Reviewed TF recs w/ Dr. Schafer today.    Discharge plan: too soon to determine     Nutrition Risk Screen    Nutrition Risk Screen: no indicators present    Nutrition/Diet History    Spiritual, Cultural Beliefs, Yazidi Practices, Values that Affect Care: no    Anthropometrics    Temp: 99.5 °F (37.5 °C)  Height Method: Stated  Height: 5' 9" (175.3 cm)  Height (inches): 69 in  Weight Method: Standard Scale  Weight: 74.5 kg (164 lb 3.9 oz)  Weight (lb): 164.24 lb  Ideal Body Weight (IBW), Female: 145 lb  % Ideal Body Weight, Female (lb): 113.27 lb  BMI (Calculated): 24.3  BMI Grade: 18.5-24.9 - normal       Lab/Procedures/Meds    Pertinent Labs Reviewed: reviewed  BMP  Lab Results   Component Value Date     05/30/2019    K 3.4 (L) 05/30/2019     " 05/30/2019    CO2 22 (L) 05/30/2019    BUN 12 05/30/2019    CREATININE 0.9 05/30/2019    CALCIUM 7.6 (L) 05/30/2019    ANIONGAP 9 05/30/2019    ESTGFRAFRICA >60 05/30/2019    EGFRNONAA >60 05/30/2019     Lab Results   Component Value Date    CALCIUM 7.6 (L) 05/30/2019    PHOS 2.3 (L) 05/29/2019     Lab Results   Component Value Date    ALBUMIN 3.0 (L) 05/27/2019     No results for input(s): POCTGLUCOSE in the last 24 hours.  No results found for: LABA1C, HGBA1C    Pertinent Medications Reviewed: reviewed      Estimated/Assessed Needs    Weight Used For Calorie Calculations: 74.5 kg (164 lb 3.9 oz)  Energy Calorie Requirements (kcal): 1821  Energy Need Method: [facility]  Protein Requirements:  g  Weight Used For Protein Calculations: 74.5 kg (164 lb 3.9 oz)     Estimated Fluid Requirement Method: RDA Method(or per MD)  RDA Method (mL): 1821         Nutrition Prescription Ordered    Nutrition Order Comments: NPO    Evaluation of Received Nutrient/Fluid Intake    Other Calories (kcal): 535.92(Propofol @20.3ml/hr)  % Kcal Needs: 29  % Protein Needs: 0     Intake/Output Summary (Last 24 hours) at 5/30/2019 1549  Last data filed at 5/30/2019 1400  Gross per 24 hour   Intake 6128 ml   Output 750 ml   Net 5378 ml       % Intake of Estimated Energy Needs: 25 - 50 %  % Meal Intake: NPO    Nutrition Risk      2xweekly    Assessment and Plan    Moderate malnutrition  Malnutrition in the context of Chronic Illness/Injury    Related to (etiology):  Inadequate energy intake     Signs and Symptoms (as evidenced by):  Energy Intake: <50% of estimated energy requirement for > 1 week  Body Fat Depletion: mild depletion of orbitals, triceps and thoracic and lumbar region   Muscle Mass Depletion: mild depletion of temples, clavicle region and scapular region     Interventions/Recommendations (treatment strategy):  See above     Nutrition Diagnosis Status:  New       Monitor and Evaluation    Food and Nutrient Intake: energy  intake, enteral nutrition intake  Food and Nutrient Adminstration: enteral and parenteral nutrition administration  Anthropometric Measurements: weight  Biochemical Data, Medical Tests and Procedures: electrolyte and renal panel, glucose/endocrine profile  Nutrition-Focused Physical Findings: overall appearance     Malnutrition Assessment                 Orbital Region (Subcutaneous Fat Loss): mild depletion  Upper Arm Region (Subcutaneous Fat Loss): mild depletion  Thoracic and Lumbar Region: mild depletion   Baptism Region (Muscle Loss): mild depletion  Clavicle Bone Region (Muscle Loss): mild depletion  Clavicle and Acromion Bone Region (Muscle Loss): mild depletion                 Nutrition Follow-Up    RD Follow-up?: Yes

## 2019-05-30 NOTE — ASSESSMENT & PLAN NOTE
Vitals:    05/30/19 1531 05/30/19 1535 05/30/19 1540 05/30/19 1545   BP: (!) 73/48 (!) 88/55 (!) 83/57 (!) 87/56   BP Location:       Patient Position:       Pulse: (!) 139 (!) 134 (!) 136 (!) 137   Resp: (!) 29 (!) 28 (!) 29 (!) 29   Temp:       TempSrc:       SpO2: (!) 86% (!) 87% (!) 88% (!) 89%   Weight:       Height:             ID consulted. Appreciate ID input.     [x] Respiratory rate >20 breaths/min or PaCO2 <32 mmHg   []  WBC >12,000 cells/mm3, <4000 cells/mm3, or >10 percent immature (band) forms  [x] Heart rate >90 beats/min   [] Temperature >38ºC or <36ºC    Microbiology: Panculturel.   Oxygenation: Initiate mechanical ventilatory support. Keep SAO2 > = 92%  Hemodynamics: IV norepinephrine. IV Vasopressin. IV crystalloids. Keep MAP > = 65mmHg  Source control: IV Meropenem, IV Vancomycin, IV Voriconazole.

## 2019-05-30 NOTE — PROGRESS NOTES
Left radial art line successfully placed by Hanh MARION.  Good waveform, good blood return. BP 74/53 (63).  Will titrate levo off art line.

## 2019-05-30 NOTE — ASSESSMENT & PLAN NOTE
Pancytopenia secondary to hairy cell leukemia/sepsis:  --continue to monitor and transfuse irradiated products as needed    May 30, 2019  --Transfuse if platelets <10  --Transfuse if hemoglobin <8  --Daily CBC

## 2019-05-30 NOTE — NURSING
Patient SPO2 was 86% on 15L ventimask, Tachycardic in 140's, hypotensive 90's/50's, and temp 100.  Respiratory notified. Dr. Moscoso and MALCOLM Parham notified and in room.  Patient put on vasotherm 35L 65%. Stat EKG and Lactic Acid ordered.  No improvement of vitals. Patient transferred to ICU rm8.  Bedside report given to ICU nurse. Patient is AAO.

## 2019-05-30 NOTE — PROCEDURES
"Alissa Sadler is a 58 y.o. female patient.    Temp: 100.1 °F (37.8 °C) (19 1530)  Pulse: (!) 137 (19 1545)  Resp: (!) 29 (19 1545)  BP: (!) 87/56 (19 1545)  SpO2: (!) 89 % (19 1545)  Weight: 74.5 kg (164 lb 3.9 oz) (19 1236)  Height: 5' 9" (175.3 cm) (19 1716)       Intubation  Date/Time: 2019 2:15 PM  Location procedure was performed: [facility] INTENSIVE CARE UNIT  Performed by: Andrea Schafer MD  Authorized by: Andrea Schafer MD   Pre-operative diagnosis: Acute Respiratory Failure  Post-operative diagnosis: Same  Consent Done: Yes  Consent: Verbal consent obtained. Written consent obtained.  Risks and benefits: risks, benefits and alternatives were discussed  Consent given by: spouse  Patient understanding: patient states understanding of the procedure being performed  Patient consent: the patient's understanding of the procedure matches consent given  Procedure consent: procedure consent matches procedure scheduled  Relevant documents: relevant documents present and verified  Test results: test results available and properly labeled  Patient identity confirmed: , MRN and verbally with patient  Indications: respiratory failure  Intubation method: direct  Patient status: sedated  Preoxygenation: BVM  Sedatives: propofol  Paralytic: none  Laryngoscope size: Mac 4  Tube size: 8.0 mm  Tube type: cuffed  Number of attempts: 1  Ventilation between attempts: BVM  Cricoid pressure: no  Cords visualized: yes  Post-procedure assessment: chest rise and CO2 detector  Breath sounds: equal and absent over the epigastrium and reduced on left  Cuff inflated: yes  ETT to teeth: 25 cm  Tube secured with: ETT fox  Chest x-ray interpreted by other physician, radiologist and me.  Chest x-ray findings: endotracheal tube in appropriate position  Patient tolerance: Patient tolerated the procedure well with no immediate complications  Complications: No  Estimated blood loss (mL): " 5  Specimens: No  Implants: No          Andrea Schafer  5/30/2019

## 2019-05-30 NOTE — SUBJECTIVE & OBJECTIVE
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     Antibiotics (From admission, onward)    Start     Stop Route Frequency Ordered    05/29/19 1630  vancomycin (VANCOCIN) 1,250 mg in dextrose 5 % 250 mL IVPB      -- IV Every 12 hours (non-standard times) 05/29/19 0540    05/27/19 2330  piperacillin-tazobactam 4.5 g in dextrose 5 % 100 mL IVPB (ready to mix system)      -- IV Every 8 hours (non-standard times) 05/27/19 1447        Antifungals (From admission, onward)    Start     Stop Route Frequency Ordered    05/30/19 1930  voriconazole (VFEND) 300 mg in dextrose 5 % 100 mL IVPB      -- IV Every 12 hours (non-standard times) 05/29/19 1635    05/29/19 1745  voriconazole (VFEND) 450 mg in dextrose 5 % 100 mL IVPB      05/30 1929 IV Every 12 hours (non-standard times) 05/29/19 1635        Antivirals (From admission, onward)    None             There is no immunization history on file for this patient.    Family History     Problem Relation (Age of Onset)    Lung cancer Father        Social History     Socioeconomic History    Marital status:      Spouse name: Not on file    Number of children: Not on file    Years of education: Not on file    Highest education level: Not on file   Occupational History    Not on file   Social Needs    Financial resource strain: Not on file    Food insecurity:     Worry: Not on file     Inability: Not on file    Transportation needs:     Medical: Not on file     Non-medical: Not on file   Tobacco Use    Smoking status: Never Smoker    Smokeless tobacco: Never  Used   Substance and Sexual Activity    Alcohol use: No    Drug use: No    Sexual activity: Not on file   Lifestyle    Physical activity:     Days per week: Not on file     Minutes per session: Not on file    Stress: Not on file   Relationships    Social connections:     Talks on phone: Not on file     Gets together: Not on file     Attends Confucianism service: Not on file     Active member of club or organization: Not on file     Attends meetings of clubs or organizations: Not on file     Relationship status: Not on file   Other Topics Concern    Not on file   Social History Narrative    Not on file     Review of Systems   Constitutional: Positive for fever. Negative for activity change, appetite change, chills, diaphoresis, fatigue and unexpected weight change.        Generalized weakness and fatigue   HENT: Negative for congestion, drooling, facial swelling, rhinorrhea, sinus pressure, sneezing and sore throat.    Eyes: Negative for discharge, redness, itching and visual disturbance.   Respiratory: Positive for cough and shortness of breath. Negative for apnea, choking, chest tightness, wheezing and stridor.    Cardiovascular: Negative for chest pain, palpitations and leg swelling.   Gastrointestinal: Negative for abdominal distention, abdominal pain, anal bleeding, blood in stool, constipation, diarrhea, nausea and vomiting.   Genitourinary: Negative for decreased urine volume, difficulty urinating, dysuria, frequency, hematuria, pelvic pain, urgency, vaginal bleeding and vaginal discharge.   Musculoskeletal: Negative for arthralgias, back pain, gait problem, joint swelling, myalgias, neck pain and neck stiffness.   Skin: Negative for color change, pallor, rash and wound.   Neurological: Negative for dizziness, seizures, facial asymmetry, speech difficulty, weakness, light-headedness, numbness and headaches.   Psychiatric/Behavioral: Negative for agitation, confusion, hallucinations and suicidal ideas.    All other systems reviewed and are negative.    Objective:     Vital Signs (Most Recent):  Temp: 100 °F (37.8 °C) (05/30/19 0715)  Pulse: (!) 139 (05/30/19 0715)  Resp: (!) 22 (05/30/19 0715)  BP: (!) 87/57 (05/30/19 0715)  SpO2: (!) 86 % (05/30/19 0715) Vital Signs (24h Range):  Temp:  [98.6 °F (37 °C)-100 °F (37.8 °C)] 100 °F (37.8 °C)  Pulse:  [109-139] 139  Resp:  [18-24] 22  SpO2:  [84 %-94 %] 86 %  BP: ()/(45-58) 87/57     Weight: 74.5 kg (164 lb 3.9 oz)  Body mass index is 24.25 kg/m².    Estimated Creatinine Clearance: 72.9 mL/min (based on SCr of 0.9 mg/dL).    Physical Exam   Constitutional: She is oriented to person, place, and time. She appears well-developed and well-nourished.   HENT:   Head: Normocephalic and atraumatic.   Eyes: Pupils are equal, round, and reactive to light. Conjunctivae and EOM are normal.   Neck: Normal range of motion. Neck supple.   Cardiovascular: Regular rhythm, normal heart sounds and intact distal pulses.   No murmur heard.  Tachycardic   Pulmonary/Chest: Effort normal and breath sounds normal. No respiratory distress.   Abdominal: Soft. Bowel sounds are normal. She exhibits no distension. There is no tenderness.   Musculoskeletal: Normal range of motion. She exhibits no edema, tenderness or deformity.   Neurological: She is alert and oriented to person, place, and time. She has normal reflexes.   Skin: Skin is warm and dry. No erythema.   Psychiatric: She has a normal mood and affect. Her behavior is normal.   Nursing note and vitals reviewed.      Significant Labs:   Blood Culture:   Recent Labs   Lab 05/27/19  1410 05/27/19  1420   LABBLOO No Growth to date  No Growth to date  No Growth to date No Growth to date  No Growth to date  No Growth to date     BMP:   Recent Labs   Lab 05/29/19 2020 05/30/19 0524   GLU  --  100   NA  --  138   K  --  3.4*   CL  --  107   CO2  --  22*   BUN  --  12   CREATININE  --  0.9   CALCIUM  --  7.6*   MG 1.6  --      CBC:    Recent Labs   Lab 05/29/19  0310   WBC 0.71*   HGB 8.6*   HCT 26.0*   PLT 37*     All pertinent labs within the past 24 hours have been reviewed.    Significant Imaging: I have reviewed all pertinent imaging results/findings within the past 24 hours.

## 2019-05-30 NOTE — ASSESSMENT & PLAN NOTE
- Blood cultures pending   - IV vanc/zosyn  - Lactic acid is negative  - Procalcitonin is 0.23  5/28/19 Platelets are 36K this AM. Will continue to monitor. Hem/onc following.   5/29/19  The patient spiked a temp of 101.8 overnight despite being on ABX, reports increased coughing. WBC decreased to 0.71. Hem/onc following. Will consult ID, the patient may need fungal coverage given immune compromised state. Repeat CXR today.     -will change IVAB to meropenem . Cont vanc and voriconazole  -transfer to ICU

## 2019-05-30 NOTE — PROCEDURES
"Alissa Sadler is a 58 y.o. female patient.    Temp: 100.1 °F (37.8 °C) (19 1530)  Pulse: (!) 137 (19 1545)  Resp: (!) 29 (19 1545)  BP: (!) 87/56 (19 1545)  SpO2: (!) 89 % (19 1545)  Weight: 74.5 kg (164 lb 3.9 oz) (19 1236)  Height: 5' 9" (175.3 cm) (19 1716)       Arterial Line  Date/Time: 2019 4:00 PM  Location procedure was performed: [facility] INTENSIVE CARE UNIT  Performed by: Andrea Schafer MD  Authorized by: Andrea Schafer MD   Consent Done: Yes  Consent: Verbal consent obtained. Written consent obtained.  Consent given by: patient  Patient understanding: patient states understanding of the procedure being performed  Patient consent: the patient's understanding of the procedure matches consent given  Procedure consent: procedure consent matches procedure scheduled  Relevant documents: relevant documents present and verified  Test results: test results available and properly labeled  Site marked: the operative site was marked  Imaging studies: imaging studies available  Patient identity confirmed: , MRN and verbally with patient  Time out: Immediately prior to procedure a "time out" was called to verify the correct patient, procedure, equipment, support staff and site/side marked as required.  Preparation: Patient was prepped and draped in the usual sterile fashion.  Indications: multiple ABGs and respiratory failure  Location: left radial  Anesthesia: local infiltration    Anesthesia:  Local Anesthetic: lidocaine 1% with epinephrine  Anesthetic total: 10 mL  Patient sedated: yes  Sedatives: propofol  Analgesia: fentanyl  Artem's test normal: yes  Needle gauge: 18  Seldinger technique: Seldinger technique used  Number of attempts: 1  Complications: No  Specimens: No  Post-procedure: line sutured and dressing applied  Post-procedure CMS: unchanged  Patient tolerance: Patient tolerated the procedure well with no immediate complications          Andrea" Hanh  5/30/2019

## 2019-05-30 NOTE — PROGRESS NOTES
Central line placement confirmed.  MD stated okay to use central line for levo.  Levo started at this time.

## 2019-05-30 NOTE — ASSESSMENT & PLAN NOTE
05/29- will continue vanco/zosyn and will add empiric Voriconazole , will send fungitell assay , follow cultures

## 2019-05-30 NOTE — ASSESSMENT & PLAN NOTE
Hairy cell leukemia diagnosed greater than 5 years ago without previous treatment.   O/P Oncologist is Dr. Gregg.   Heme Onc Consulted.   Await input.

## 2019-05-30 NOTE — ASSESSMENT & PLAN NOTE
- Monitor and transfuse if symptomatic   - CBC in am   5/28/19 Platelets are 36K this AM. Will continue to monitor. Hem/onc following.    5/29/19 WBC decreased to 0.71. Hem/onc following.  Plts 37K  Multifactorial due to sepsis and  H/O Leukemia   Hematology on board

## 2019-05-30 NOTE — ASSESSMENT & PLAN NOTE
Intubate patient. Initiate Mechanical ventilatory support. ventilator support. Ventilator settings reviewed and adjusted to optimize gas exchange. Daily wake up and breathe trials.  Titrate FIO2 to keep SAO2 > 92%. Bronchodilators per protocol.

## 2019-05-30 NOTE — ASSESSMENT & PLAN NOTE
SHORT TERM SEDATION:     []        Midazolam  [x]        Propofol  []        Dexmedetomidine     ANALGESIA:     []        Morphine   [x]        Fentanyl  []        Hydromorphone.         RASS  => - 4     Neurochecks per routine.  Daily sedation vacation

## 2019-05-30 NOTE — CONSULTS
[facility] -   Critical Care Medicine  Consult Note    Patient Name: Alissa Sadler  MRN: [id number]  Admission Date: 5/27/2019  Hospital Length of Stay: 3 days  Code Status: Full Code  Attending Physician: Jermaine Chow, *   Primary Care Provider: Amador Gay MD   Principal Problem: Acute hypoxemic respiratory failure      Subjective:     HPI:  58 year old female who  has a past medical history of Brain tumor, breast cancer, hairy cell leukemia (2016), immune compromise, admitted with severe sepsis secondary to lobar pneumonia.     Hospital/ICU Course:  05/30: Transferred to the MICU today for worsening tachycardia and hypotension. Patient not responding to escalation of antibiotic therapy and volume reuscitation. Worsening oxygen requirements. Currently on HFNC 100% at 35 L /min. SAO2 is in the 85%.     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    HYSTERECTOMY      MASTECTOMY Right        Review of patient's allergies indicates:   Allergen Reactions    Adhesive Rash     Scheduled Meds:   dextromethorphan-guaifenesin  mg  1 tablet Oral BID    meropenem (MERREM) IVPB  500 mg Intravenous Q6H    vancomycin (VANCOCIN) IVPB  1,250 mg Intravenous Q12H    vorconazole (VFEND) IVPB  4 mg/kg Intravenous Q12H     Continuous Infusions:   sodium chloride 0.9% 150 mL/hr at 05/30/19 1300     PRN Meds:.acetaminophen, albuterol-ipratropium, baclofen, ketorolac, ondansetron, promethazine-codeine 6.25-10 mg/5 ml, sodium chloride 0.9%       Family History     Problem Relation (Age of Onset)    Lung cancer Father        Tobacco Use    Smoking status: Never Smoker    Smokeless tobacco: Never Used   Substance and Sexual Activity    Alcohol use: No    Drug use: No    Sexual activity: Not on file         Review of  Systems   Constitutional: Positive for fever. Negative for activity change, appetite change, chills, diaphoresis, fatigue and unexpected weight change.        Generalized weakness and fatigue   HENT: Negative for congestion, drooling, facial swelling, rhinorrhea, sinus pressure, sneezing and sore throat.    Eyes: Negative for discharge, redness, itching and visual disturbance.   Respiratory: Positive for cough and shortness of breath. Negative for apnea, choking, chest tightness, wheezing and stridor.    Cardiovascular: Negative for chest pain, palpitations and leg swelling.   Gastrointestinal: Negative for abdominal distention, abdominal pain, anal bleeding, blood in stool, constipation, diarrhea, nausea and vomiting.   Genitourinary: Negative for decreased urine volume, difficulty urinating, dysuria, frequency, hematuria, pelvic pain, urgency, vaginal bleeding and vaginal discharge.   Musculoskeletal: Negative for arthralgias, back pain, gait problem, joint swelling, myalgias, neck pain and neck stiffness.   Skin: Negative for color change, pallor, rash and wound.   Neurological: Negative for dizziness, seizures, facial asymmetry, speech difficulty, weakness, light-headedness, numbness and headaches.   Psychiatric/Behavioral: Negative for agitation, confusion, hallucinations and suicidal ideas.   All other systems reviewed and are negative.    Objective:     Vital Signs (Most Recent):  Temp: 99.5 °F (37.5 °C) (05/30/19 1130)  Pulse: (!) 152 (05/30/19 1300)  Resp: (!) 30 (05/30/19 1300)  BP: 114/60 (05/30/19 1300)  SpO2: (!) 88 % (05/30/19 1300) Vital Signs (24h Range):  Temp:  [98.6 °F (37 °C)-100 °F (37.8 °C)] 99.5 °F (37.5 °C)  Pulse:  [109-154] 152  Resp:  [18-35] 30  SpO2:  [83 %-94 %] 88 %  BP: ()/() 114/60     Weight: 74.5 kg (164 lb 3.9 oz)  Body mass index is 24.25 kg/m².      Intake/Output Summary (Last 24 hours) at 5/30/2019 1402  Last data filed at 5/30/2019 1300  Gross per 24 hour   Intake  5978 ml   Output 750 ml   Net 5228 ml       Physical Exam   Constitutional: She is oriented to person, place, and time. She appears well-developed and well-nourished. No distress.   HENT:   Head: Normocephalic and atraumatic.   Right Ear: External ear normal.   Left Ear: External ear normal.   Nose: Nose normal.   Mouth/Throat: Oropharynx is clear and moist. No oropharyngeal exudate.   Eyes: Pupils are equal, round, and reactive to light. Conjunctivae and EOM are normal. Right eye exhibits no discharge. Left eye exhibits no discharge.   Neck: Normal range of motion. Neck supple. No tracheal deviation present. No thyromegaly present.   Cardiovascular: Normal rate, regular rhythm, normal heart sounds and intact distal pulses. Exam reveals no gallop and no friction rub.   No murmur heard.  Pulmonary/Chest: Tachypnea noted. She is in respiratory distress. She has no wheezes. She has no rhonchi. She has rales. She exhibits no tenderness.   Abdominal: Soft. Bowel sounds are normal. She exhibits no distension. There is no tenderness. There is no guarding.   Musculoskeletal: Normal range of motion. She exhibits no edema or tenderness.   Neurological: She is alert and oriented to person, place, and time. She displays normal reflexes. No cranial nerve deficit or sensory deficit. Coordination normal.   Skin: Skin is warm, dry and intact. Capillary refill takes less than 2 seconds. No abrasion, no bruising and no rash noted. No cyanosis. Nails show no clubbing.   Psychiatric: She has a normal mood and affect. Her behavior is normal. Judgment and thought content normal.       Vents:  Oxygen Concentration (%): 85 (05/30/19 1130)    Lines/Drains/Airways     Peripheral Intravenous Line                 Peripheral IV - Single Lumen 05/27/19 1400 20 G Left Antecubital 3 days                Significant Labs:    CBC/Anemia Profile:  Recent Labs   Lab 05/29/19  0310 05/30/19  0524   WBC 0.71* 0.54*   HGB 8.6* 8.6*   HCT 26.0* 26.3*   PLT  37* 44*   MCV 92 92   RDW 16.3* 16.9*        Chemistries:  Recent Labs   Lab 05/29/19  0310 05/29/19 2020 05/30/19 0524     --  138   K 3.4*  --  3.4*     --  107   CO2 22*  --  22*   BUN 10  --  12   CREATININE 0.8  --  0.9   CALCIUM 8.2*  --  7.6*   MG  --  1.6  --    PHOS  --  2.3*  --        BMP:   Recent Labs   Lab 05/29/19 2020 05/30/19  0524   GLU  --  100   NA  --  138   K  --  3.4*   CL  --  107   CO2  --  22*   BUN  --  12   CREATININE  --  0.9   CALCIUM  --  7.6*   MG 1.6  --      Lactic Acid:   Recent Labs   Lab 05/30/19  0811 05/30/19  1150   LACTATE 2.5* 2.6*     Troponin:   Recent Labs   Lab 05/29/19  1128   TROPONINI <0.006         Significant Imaging:     CT Chest Without Contrast: 05/29/19:       There is focal consolidation seen involving the superior segment of the right lower lobe as well as the medial and posterior basal segments of the left lower lobe.  There is also focal consolidation with air bronchograms in the left upper lobe.  Findings are suspicious for pneumonia.  There is also a small area of focal nodular thickening in the posterior aspect of the right upper lobe which also could be related to a small area of focal pneumonia.  No central masses.    No pleural fluid or pneumothorax.    No adenopathy is identified.    No significant osseous abnormality.    There is a cyst involving the left lobe of the liver measuring about 2.4 cm.    The heart is normal in size.        ABG  Recent Labs   Lab 05/30/19  1529   PH 7.273*   PO2 60*   PCO2 41.9   HCO3 19.4*   BE -7     Assessment/Plan:     I have reviewed all labs and imaging studies and compared to previous results. I have also discussed labs with all the teams in the medical care of the patient and my plan is outlined below     Neuro    SHORT TERM SEDATION:     []        Midazolam  [x]        Propofol  []        Dexmedetomidine     ANALGESIA:     []        Morphine   [x]        Fentanyl  []        Hydromorphone.          RASS  => -  4     Neurochecks per routine.  Daily sedation vacation    Pulmonary  * Acute hypoxemic respiratory failure  Intubate patient. Initiate Mechanical ventilatory support. ventilator support. Ventilator settings reviewed and adjusted to optimize gas exchange. Daily wake up and breathe trials.  Titrate FIO2 to keep SAO2 > 92%. Bronchodilators per protocol.    Severe pneumonia  Empiric broad spectrum antibiotics.   IV MEROPENEM, VANCOMYCIN, ZYVOX.     Renal/  Monitor BUN / Cr. Montor urine output.    ID  Severe sepsis with acute organ dysfunction    Vitals:    05/30/19 1531 05/30/19 1535 05/30/19 1540 05/30/19 1545   BP: (!) 73/48 (!) 88/55 (!) 83/57 (!) 87/56   BP Location:       Patient Position:       Pulse: (!) 139 (!) 134 (!) 136 (!) 137   Resp: (!) 29 (!) 28 (!) 29 (!) 29   Temp:       TempSrc:       SpO2: (!) 86% (!) 87% (!) 88% (!) 89%   Weight:       Height:             ID consulted. Appreciate ID input.     [x] Respiratory rate >20 breaths/min or PaCO2 <32 mmHg   []  WBC >12,000 cells/mm3, <4000 cells/mm3, or >10 percent immature (band) forms  [x] Heart rate >90 beats/min   [] Temperature >38ºC or <36ºC    Microbiology: Panculturel.   Oxygenation: Initiate mechanical ventilatory support. Keep SAO2 > = 92%  Hemodynamics: IV norepinephrine. IV Vasopressin. IV crystalloids. Keep MAP > = 65mmHg  Source control: IV Meropenem, IV Vancomycin, IV Voriconazole.     Oncology  Other neutropenia    Secondary to HCL.  Neutropenia precautions.  Heme Onc Consulted. Await input.      Breast CA  Hem/onc consulted. Await input.    Hold TAMOXIFEN    Pancytopenia  Monitor hemogram. Conservative transfusion strategy.     Hairy cell leukemia  Hairy cell leukemia diagnosed greater than 5 years ago without previous treatment.   O/P Oncologist is Dr. Gregg.   Heme Onc Consulted.   Await input.         Endocrine  Moderate malnutrition  RD consulted for Nutrition recommendations. Appreciate input.    GI  NPO for now.  Tube feeding will be initiated per RD recommendations.     Other  Shock circulatory   IV Vasopressors: NOREPINEPHRINE, VASOPRESSIN ( Non titrating).   Monitor hemodynamics. MAP goal of 65 mmHg.        Critical Care Daily Checklist:    A: Awake: RASS Goal/Actual Goal:    - 4   Actual:   - 4   B: Spontaneous Breathing Trial Performed?  N/A   C: SAT & SBT Coordinated?  N/A                 D: Delirium: CAM-ICU     E: Early Mobility Performed? No   F: Feeding Goal: Goals: Meet > 85 % EEN/EPN while admitted  Status: Nutrition Goal Status: new   Current Diet Order   Procedures    Diet NPO      AS: Analgesia/Sedation FENTANYL + PROPOFOL   T: Thromboembolic Prophylaxis LDUFH   H: HOB > 300 Yes   U: Stress Ulcer Prophylaxis (if needed) FAMOTIDINE   G: Glucose Control SBGM   B: Bowel Function Stool Occurrence: 1   I: Indwelling Catheter (Lines & Rubin) Necessity YES   D: De-escalation of Antimicrobials/Pharmacotherapies YES    Plan for the day/ETD Intubate patient . Mechanical ventilation. Empiric broad spectrum antibiotics, hemodynamic support.     Code Status:  Family/Goals of Care: Full Code  Pending course     Critical Care Time: 120 minutes  Critical secondary to Patient has a condition that poses threat to life and bodily function: ACUTE RESPIRATORY FAILURE,. SEVERE SEPSIS, CIRCULATORY SHOCK,SEVERE PNEUMONIA     Critical care was time spent personally by me on the following activities: development of treatment plan with patient or surrogate and bedside caregivers, discussions with consultants, evaluation of patient's response to treatment, examination of patient, ordering and performing treatments and interventions, ordering and review of laboratory studies, ordering and review of radiographic studies, pulse oximetry, re-evaluation of patient's condition. This critical care time did not overlap with that of any other provider or involve time for any procedures.    Thank you for your consult. I will follow-up with  patient. Please contact us if you have any additional questions.     Andrea Schafer MD  Critical Care Medicine  [facility]

## 2019-05-30 NOTE — PLAN OF CARE
Problem: Adult Inpatient Plan of Care  Goal: Plan of Care Review  Outcome: Ongoing (interventions implemented as appropriate)  Recommendations     Recommendation/Intervention: 1. Rec TF of Peptamen Intense VHP @ 50 ml/hr with flushes as needed with medications and additional per MD/NP for hydration. With current propofol infusion, provides 1736 calories, 111 g protein, and 1008 ml free water. 2. Will monitor propofol infusion and adjust recs prn.    Intervention: Coordination of care   Goals: Meet > 85 % EEN/EPN while admitted  Nutrition Goal Status: new  Communication of RD Recs: (Reviewed with MD, POC)

## 2019-05-30 NOTE — PROGRESS NOTES
Hanh MARION notified of lactic increased to 2.6, HR 150s, BP stable last 114/60 (77), and pulse ox 88% on 35L 80%.  Pt resting in bed, just finished eating lunch.  MD to assess labs and pt.  Will continue to monitor.

## 2019-05-30 NOTE — PROGRESS NOTES
"Pt stated okay to release information to her  Pete and brother JAMIR.  Pt password is "[email or web address]".    "

## 2019-05-30 NOTE — ASSESSMENT & PLAN NOTE
Continue broad-spectrum antibiotics/supportive care.  Patient has severe neutropenia secondary to hairy cell leukemia which may slow recovery.  --continue to monitor closely  --supportive care/broad-spectrum antibiotic    May 30, 2019  --Patient with declining clinically  --Awaiting Pulmonology Recommendations  --Continue IV Vancomycin. Merrem added. Follow ID recs  --supplemental oxgyen  --Nebs PRN  --Continue supportive care

## 2019-05-30 NOTE — ASSESSMENT & PLAN NOTE
05/29- will need close follow up, continue broad spectrum antibiotic as she is an immunocompromised host , will deescalate soon.

## 2019-05-30 NOTE — PROCEDURES
"Alissa Sadler is a 58 y.o. female patient.    Temp: 100.1 °F (37.8 °C) (05/30/19 1530)  Pulse: (!) 137 (05/30/19 1545)  Resp: (!) 29 (05/30/19 1545)  BP: (!) 87/56 (05/30/19 1545)  SpO2: (!) 89 % (05/30/19 1545)  Weight: 74.5 kg (164 lb 3.9 oz) (05/27/19 1236)  Height: 5' 9" (175.3 cm) (05/27/19 1716)       Central Line  Date/Time: 5/30/2019 2:15 PM  Location procedure was performed: [facility] INTENSIVE CARE UNIT  Performed by: Andrea Schafer MD  Pre-operative Diagnosis: Severe sepsis  Post-operative diagnosis: Same  Indications: med administration and vascular access  Anesthesia: local infiltration    Anesthesia:  Local Anesthetic: lidocaine 1% with epinephrine  Anesthetic total: 10 mL  Preparation: skin prepped with ChloraPrep  Location details: right subclavian  Catheter type: triple lumen  Catheter size: 7.5 Fr  Catheter Length: 15cm    Ultrasound guidance: yes  Vessel Caliber: large, patent, compressibility normal  Vascular Doppler: not done  Needle advanced into vessel with real time Ultrasound guidance.  Guidewire confirmed in vessel.  Sterile sheath used.  Manometry: No   Number of attempts: 2  Assessment: placement verified by x-ray,  no pneumothorax on x-ray,  tip termination and successful placement  Technical procedures used: SELDINGER  Complications: none  Specimens: No  Implants: No  Post-procedure: line sutured,  chlorhexidine patch,  sterile dressing applied and blood return through all ports  Complications: No          Andrea Schafer  5/30/2019  "

## 2019-05-30 NOTE — CONSULTS
[facility]  Infectious Disease  Consult Note    Patient Name: Alissa Sadler  MRN: [id number]  Admission Date: 5/27/2019  Hospital Length of Stay: 3 days  Attending Physician: Jermaine Chow, *  Primary Care Provider: Amador Gay MD     Isolation Status: Neutropenic    Patient information was obtained from patient, past medical records and ER records.      Consults  Assessment/Plan:     * Sepsis  05/29- will need close follow up, continue broad spectrum antibiotic as she is an immunocompromised host , will deescalate soon.     Pancytopenia  05/29- will continue close follow up , transfuse as needed    Hairy cell leukemia  05/29- continue oncology follow up     Pneumonia of left upper lobe due to infectious organism  05/29- will continue vanco/zosyn and will add empiric Voriconazole , will send fungitell assay , follow cultures        Thank you for your consult. I will follow-up with patient. Please contact us if you have any additional questions.    Von Lee MD  Infectious Disease  [facility]    Subjective:     Principal Problem: Sepsis    HPI:   58 year old woman with history of   breast CA, hairy cell leukemia and anemia who was sent to the ER from her PCPs office with an elevated heart rate( HR in the 130).  There is associated history of fever .Since admission,   chest ct scan showed - focal consolidation seen involving the superior segment of the right lower lobe as well as the medial and posterior basal segments of the left lower lobe.  There is also focal consolidation with air bronchograms in the left upper lobe.  Findings are suspicious for pneumonia.  There is also a small area of focal nodular thickening in the posterior aspect of the right upper lobe which also could be related to a small area of focal pneumonia.  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     Antibiotics (From admission, onward)    Start     Stop Route Frequency Ordered    05/29/19 1630  vancomycin (VANCOCIN) 1,250 mg in dextrose 5 % 250 mL IVPB      -- IV Every 12 hours (non-standard times) 05/29/19 0540    05/27/19 2330  piperacillin-tazobactam 4.5 g in dextrose 5 % 100 mL IVPB (ready to mix system)      -- IV Every 8 hours (non-standard times) 05/27/19 1447        Antifungals (From admission, onward)    Start     Stop Route Frequency Ordered    05/30/19 1930  voriconazole (VFEND) 300 mg in dextrose 5 % 100 mL IVPB      -- IV Every 12 hours (non-standard times) 05/29/19 1635    05/29/19 1745  voriconazole (VFEND) 450 mg in dextrose 5 % 100 mL IVPB      05/30 1929 IV Every 12 hours (non-standard times) 05/29/19 1635        Antivirals (From admission, onward)    None             There is no immunization history on file for this patient.    Family History     Problem Relation (Age of Onset)    Lung cancer Father        Social History     Socioeconomic History    Marital status:      Spouse name: Not on file    Number of children: Not on file    Years of education: Not on file    Highest education level: Not on file   Occupational History    Not on file   Social Needs    Financial resource strain: Not on file    Food insecurity:     Worry: Not on file     Inability: Not on file    Transportation needs:     Medical: Not on file     Non-medical: Not on file   Tobacco Use    Smoking status: Never Smoker    Smokeless  tobacco: Never Used   Substance and Sexual Activity    Alcohol use: No    Drug use: No    Sexual activity: Not on file   Lifestyle    Physical activity:     Days per week: Not on file     Minutes per session: Not on file    Stress: Not on file   Relationships    Social connections:     Talks on phone: Not on file     Gets together: Not on file     Attends Baptism service: Not on file     Active member of club or organization: Not on file     Attends meetings of clubs or organizations: Not on file     Relationship status: Not on file   Other Topics Concern    Not on file   Social History Narrative    Not on file     Review of Systems   Constitutional: Positive for fever. Negative for activity change, appetite change, chills, diaphoresis, fatigue and unexpected weight change.        Generalized weakness and fatigue   HENT: Negative for congestion, drooling, facial swelling, rhinorrhea, sinus pressure, sneezing and sore throat.    Eyes: Negative for discharge, redness, itching and visual disturbance.   Respiratory: Positive for cough and shortness of breath. Negative for apnea, choking, chest tightness, wheezing and stridor.    Cardiovascular: Negative for chest pain, palpitations and leg swelling.   Gastrointestinal: Negative for abdominal distention, abdominal pain, anal bleeding, blood in stool, constipation, diarrhea, nausea and vomiting.   Genitourinary: Negative for decreased urine volume, difficulty urinating, dysuria, frequency, hematuria, pelvic pain, urgency, vaginal bleeding and vaginal discharge.   Musculoskeletal: Negative for arthralgias, back pain, gait problem, joint swelling, myalgias, neck pain and neck stiffness.   Skin: Negative for color change, pallor, rash and wound.   Neurological: Negative for dizziness, seizures, facial asymmetry, speech difficulty, weakness, light-headedness, numbness and headaches.   Psychiatric/Behavioral: Negative for agitation, confusion, hallucinations and  suicidal ideas.   All other systems reviewed and are negative.    Objective:     Vital Signs (Most Recent):  Temp: 100 °F (37.8 °C) (05/30/19 0715)  Pulse: (!) 139 (05/30/19 0715)  Resp: (!) 22 (05/30/19 0715)  BP: (!) 87/57 (05/30/19 0715)  SpO2: (!) 86 % (05/30/19 0715) Vital Signs (24h Range):  Temp:  [98.6 °F (37 °C)-100 °F (37.8 °C)] 100 °F (37.8 °C)  Pulse:  [109-139] 139  Resp:  [18-24] 22  SpO2:  [84 %-94 %] 86 %  BP: ()/(45-58) 87/57     Weight: 74.5 kg (164 lb 3.9 oz)  Body mass index is 24.25 kg/m².    Estimated Creatinine Clearance: 72.9 mL/min (based on SCr of 0.9 mg/dL).    Physical Exam   Constitutional: She is oriented to person, place, and time. She appears well-developed and well-nourished.   HENT:   Head: Normocephalic and atraumatic.   Eyes: Pupils are equal, round, and reactive to light. Conjunctivae and EOM are normal.   Neck: Normal range of motion. Neck supple.   Cardiovascular: Regular rhythm, normal heart sounds and intact distal pulses.   No murmur heard.  Tachycardic   Pulmonary/Chest: Effort normal and breath sounds normal. No respiratory distress.   Abdominal: Soft. Bowel sounds are normal. She exhibits no distension. There is no tenderness.   Musculoskeletal: Normal range of motion. She exhibits no edema, tenderness or deformity.   Neurological: She is alert and oriented to person, place, and time. She has normal reflexes.   Skin: Skin is warm and dry. No erythema.   Psychiatric: She has a normal mood and affect. Her behavior is normal.   Nursing note and vitals reviewed.      Significant Labs:   Blood Culture:   Recent Labs   Lab 05/27/19  1410 05/27/19  1420   LABBLOO No Growth to date  No Growth to date  No Growth to date No Growth to date  No Growth to date  No Growth to date     BMP:   Recent Labs   Lab 05/29/19 2020 05/30/19 0524   GLU  --  100   NA  --  138   K  --  3.4*   CL  --  107   CO2  --  22*   BUN  --  12   CREATININE  --  0.9   CALCIUM  --  7.6*   MG 1.6  --       CBC:   Recent Labs   Lab 05/29/19  0310   WBC 0.71*   HGB 8.6*   HCT 26.0*   PLT 37*     All pertinent labs within the past 24 hours have been reviewed.    Significant Imaging: I have reviewed all pertinent imaging results/findings within the past 24 hours.

## 2019-05-30 NOTE — PROGRESS NOTES
Patient hypotensive, current BP 81/47.  Patient receiving 75cc/hr NS.  Patient AOx 3, O2 sat low 90s on 4LNC.  Patient has bilateral lower lobe rales, LLL expiratory wheezes, secondary to likely bilateral PNA.  CXR showing multifocoal left sided pneumonia with parapneumonia effusion.  Ordered 2L NS bolus stat.  CT chest w/o contrast stat.  Continue to monitor.

## 2019-05-30 NOTE — PROGRESS NOTES
[facility] -   Hematology/Oncology  Progress Note    Patient Name: Alissa Sadler  Admission Date: 5/27/2019  Hospital Length of Stay: 3 days  Code Status: Full Code     Subjective:     HPI:      58-year-old female with history of DCIS and hairy cell leukemia diagnosed in approximately 2012.  She has not required treatment, however, her counts have slowly declined over the previous 5 years.  She was sent to the emergency room from her primary care doctor with tachycardia/fever which had been present for the previous 48 hr.  Chest x-ray was consistent with pneumonia and she was admitted for pneumonia/sepsis    Interval History: Patient still with fever spikes on IV Vanc/Zoysn. T max last 24 H 101.9. ID consult today.       May 30, 2019: Patient with hypotension, hypoxia, tachycardia this am. Feels SOB. CT chest shows extensive pneumonia. She has been transferred to to ICU. Pulmonology has been consulted. Now on Merrem/ Vancomycin/Voriconazole. On Vapotherm   Oncology Treatment Plan:   [No treatment plan]    Medications:  Continuous Infusions:   sodium chloride 0.9% 150 mL/hr at 05/30/19 1015     Scheduled Meds:   dextromethorphan-guaifenesin  mg  1 tablet Oral BID    meropenem (MERREM) IVPB  500 mg Intravenous Q6H    vancomycin (VANCOCIN) IVPB  1,250 mg Intravenous Q12H    vorconazole (VFEND) IVPB  4 mg/kg Intravenous Q12H     PRN Meds:acetaminophen, albuterol-ipratropium, baclofen, ketorolac, ondansetron, promethazine-codeine 6.25-10 mg/5 ml, sodium chloride 0.9%     Review of patient's allergies indicates:   Allergen Reactions    Adhesive Rash        Past Medical History:   Diagnosis Date    Brain tumor     Hairy cell leukemia 2016     Past Surgical History:   Procedure Laterality Date    APPENDECTOMY      BRAIN SURGERY  2005    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 Negative for appetite change, chills and diaphoresis.   HENT: Negative for congestion, dental problem, drooling, ear discharge, facial swelling, mouth sores, nosebleeds, sinus pressure and sinus pain.    Eyes: Negative for photophobia, pain, discharge, itching and visual disturbance.   Respiratory: Positive for cough and shortness of breath. Negative for apnea, choking, chest tightness, wheezing and stridor.    Cardiovascular: Positive for palpitations. Negative for chest pain and leg swelling.   Gastrointestinal: Negative for abdominal distention, abdominal pain, anal bleeding, blood in stool, constipation, diarrhea, nausea and vomiting.   Endocrine: Negative for cold intolerance, heat intolerance, polydipsia and polyphagia.   Genitourinary: Negative for difficulty urinating, dyspareunia, dysuria, flank pain, frequency, pelvic pain and vaginal bleeding.   Musculoskeletal: Positive for myalgias. Negative for arthralgias, back pain, gait problem, joint swelling and neck pain.   Skin: Negative for pallor, rash and wound.   Allergic/Immunologic: Negative for environmental allergies and immunocompromised state.   Neurological: Negative for dizziness, tremors, seizures, facial asymmetry, speech difficulty, light-headedness, numbness and headaches.   Hematological: Negative for adenopathy. Does not bruise/bleed easily.   Psychiatric/Behavioral: Negative for agitation, behavioral problems, confusion, dysphoric mood and hallucinations. The patient is not nervous/anxious and is not hyperactive.      Objective:     Vital Signs (Most Recent):  Temp: 99.6 °F (37.6 °C) (05/30/19 0845)  Pulse: (!) 147 (05/30/19 1237)  Resp: (!) 23 (05/30/19 1237)  BP: (!)  106/57 (05/30/19 1130)  SpO2: (!) 87 % (05/30/19 1237) Vital Signs (24h Range):  Temp:  [98.6 °F (37 °C)-100 °F (37.8 °C)] 99.6 °F (37.6 °C)  Pulse:  [109-150] 147  Resp:  [18-35] 23  SpO2:  [83 %-94 %] 87 %  BP: ()/(45-71) 106/57     Weight: 74.5 kg (164 lb 3.9 oz)  Body mass index is 24.25 kg/m².  Body surface area is 1.9 meters squared.      Intake/Output Summary (Last 24 hours) at 5/30/2019 1255  Last data filed at 5/30/2019 0500  Gross per 24 hour   Intake 4953 ml   Output 750 ml   Net 4203 ml       Physical Exam   Constitutional: She is oriented to person, place, and time. She appears well-developed and well-nourished. No distress.   Well groomed   HENT:   Head: Normocephalic and atraumatic.   Right Ear: External ear normal.   Left Ear: External ear normal.   Nose: Nose normal.   Mouth/Throat: Oropharynx is clear and moist. No oropharyngeal exudate.   Eyes: Pupils are equal, round, and reactive to light. Conjunctivae and EOM are normal. Right eye exhibits no discharge. Left eye exhibits no discharge.   Neck: Normal range of motion. Neck supple. No tracheal deviation present. No thyromegaly present.   Cardiovascular: Normal rate, regular rhythm, normal heart sounds and intact distal pulses. Exam reveals no gallop and no friction rub.   No murmur heard.  Pulmonary/Chest: Tachypnea noted. She is in respiratory distress. She has no wheezes. She has no rhonchi. She has rales. She exhibits no tenderness.   Abdominal: Soft. Bowel sounds are normal. She exhibits no distension. There is no tenderness. There is no guarding.   Musculoskeletal: Normal range of motion. She exhibits no edema or tenderness.   Neurological: She is alert and oriented to person, place, and time. She displays normal reflexes. No cranial nerve deficit or sensory deficit. Coordination normal.   Skin: Skin is warm, dry and intact. Capillary refill takes less than 2 seconds. No abrasion, no bruising and no rash noted. No cyanosis. Nails show  no clubbing.   Psychiatric: She has a normal mood and affect. Her behavior is normal. Judgment and thought content normal.       Significant Labs:   CBC:   Recent Labs   Lab 05/29/19 0310 05/30/19 0524   WBC 0.71* 0.54*   HGB 8.6* 8.6*   HCT 26.0* 26.3*   PLT 37* 44*   , CMP:   Recent Labs   Lab 05/29/19 0310 05/30/19 0524    138   K 3.4* 3.4*    107   CO2 22* 22*   * 100   BUN 10 12   CREATININE 0.8 0.9   CALCIUM 8.2* 7.6*   ANIONGAP 10 9   EGFRNONAA >60 >60   , Coagulation:   No results for input(s): PT, INR, APTT in the last 48 hours., Haptoglobin: No results for input(s): HAPTOGLOBIN in the last 48 hours., Immunology: No results for input(s): SPEP, PARMJIT, INDER, FREELAMBDALI in the last 48 hours., LDH: No results for input(s): LDHCSF, BFSOURCE in the last 48 hours. and LFTs:   No results for input(s): ALT, AST, ALKPHOS, BILITOT, PROT, ALBUMIN in the last 48 hours.    Diagnostic Results:  I have reviewed all pertinent imaging results/findings within the past 24 hours.    Assessment/Plan:     Pancytopenia  Pancytopenia secondary to hairy cell leukemia/sepsis:  --continue to monitor and transfuse irradiated products as needed    May 30, 2019  --Transfuse if platelets <10  --Transfuse if hemoglobin <8  --Daily CBC    Hairy cell leukemia  Hairy cell leukemia diagnosed greater than 5 years ago without previous treatment.  Suspect that she will need treatment in the near future and she plans to follow up with Dr. padron after discharge to discuss treatment.    Pneumonia of left upper lobe due to infectious organism  Continue broad-spectrum antibiotics/supportive care.  Patient has severe neutropenia secondary to hairy cell leukemia which may slow recovery.  --continue to monitor closely  --supportive care/broad-spectrum antibiotic    May 30, 2019  --Patient with declining clinically  --Awaiting Pulmonology Recommendations  --Continue IV Vancomycin. Merrem added. Follow ID recs  --supplemental  oxgyen  --Nebs PRN  --Continue supportive care        Thank you for your consult. I will follow-up with patient. Please contact us if you have any additional questions.     Phylicia Chung NP  Hematology/Oncology  [facility]

## 2019-05-30 NOTE — SUBJECTIVE & OBJECTIVE
Interval History:     Review of Systems   Constitutional: Positive for fever. Negative for activity change, appetite change, chills, diaphoresis, fatigue and unexpected weight change.        Generalized weakness and fatigue   HENT: Negative for congestion, drooling, facial swelling, rhinorrhea, sinus pressure, sneezing and sore throat.    Eyes: Negative for discharge, redness, itching and visual disturbance.   Respiratory: Positive for cough and shortness of breath. Negative for apnea, choking, chest tightness, wheezing and stridor.    Cardiovascular: Negative for chest pain, palpitations and leg swelling.   Gastrointestinal: Negative for abdominal distention, abdominal pain, anal bleeding, blood in stool, constipation, diarrhea, nausea and vomiting.   Genitourinary: Negative for decreased urine volume, difficulty urinating, dysuria, frequency, hematuria, pelvic pain, urgency, vaginal bleeding and vaginal discharge.   Musculoskeletal: Negative for arthralgias, back pain, gait problem, joint swelling, myalgias, neck pain and neck stiffness.   Skin: Negative for color change, pallor, rash and wound.   Neurological: Negative for dizziness, seizures, facial asymmetry, speech difficulty, weakness, light-headedness, numbness and headaches.   Psychiatric/Behavioral: Negative for agitation, confusion, hallucinations and suicidal ideas.   All other systems reviewed and are negative.    Objective:     Vital Signs (Most Recent):  Temp: 99.5 °F (37.5 °C) (05/30/19 1130)  Pulse: (!) 160 (05/30/19 1400)  Resp: (!) 32 (05/30/19 1400)  BP: (!) 103/54 (05/30/19 1400)  SpO2: (!) 86 % (05/30/19 1400) Vital Signs (24h Range):  Temp:  [98.6 °F (37 °C)-100 °F (37.8 °C)] 99.5 °F (37.5 °C)  Pulse:  [109-160] 160  Resp:  [18-37] 32  SpO2:  [83 %-94 %] 86 %  BP: ()/() 103/54     Weight: 74.5 kg (164 lb 3.9 oz)  Body mass index is 24.25 kg/m².    Intake/Output Summary (Last 24 hours) at 5/30/2019 1423  Last data filed at 5/30/2019  1400  Gross per 24 hour   Intake 6128 ml   Output 750 ml   Net 5378 ml      Physical Exam   Constitutional: She is oriented to person, place, and time. She appears well-developed and well-nourished. No distress.   HENT:   Head: Normocephalic and atraumatic.   Right Ear: External ear normal.   Left Ear: External ear normal.   Nose: Nose normal.   Mouth/Throat: Oropharynx is clear and moist. No oropharyngeal exudate.   Eyes: Pupils are equal, round, and reactive to light. Conjunctivae and EOM are normal. Right eye exhibits no discharge. Left eye exhibits no discharge.   Neck: Normal range of motion. Neck supple. No tracheal deviation present. No thyromegaly present.   Cardiovascular: Normal rate, regular rhythm, normal heart sounds and intact distal pulses. Exam reveals no gallop and no friction rub.   No murmur heard.  Pulmonary/Chest: Tachypnea noted. She is in respiratory distress. She has no wheezes. She has no rhonchi. She has rales. She exhibits no tenderness.   Abdominal: Soft. Bowel sounds are normal. She exhibits no distension. There is no tenderness. There is no guarding.   Musculoskeletal: Normal range of motion. She exhibits no edema or tenderness.   Neurological: She is alert and oriented to person, place, and time. She displays normal reflexes. No cranial nerve deficit or sensory deficit. Coordination normal.   Skin: Skin is warm, dry and intact. Capillary refill takes less than 2 seconds. No abrasion, no bruising and no rash noted. No cyanosis. Nails show no clubbing.   Psychiatric: She has a normal mood and affect. Her behavior is normal. Judgment and thought content normal.       Significant Labs: All pertinent labs within the past 24 hours have been reviewed.    Significant Imaging: I have reviewed all pertinent imaging results/findings within the past 24 hours.

## 2019-05-30 NOTE — PROGRESS NOTES
[facility] Hospital Medicine  Progress Note    Patient Name: Alissa Sadler  MRN: [id number]  Patient Class: IP- Inpatient   Admission Date: 5/27/2019  Length of Stay: 3 days  Attending Physician: Jermaine Chow, *  Primary Care Provider: Amador Gay MD        Subjective:     Principal Problem:Sepsis    HPI:  Alissa Sadler is a 59 yo female with a PMH breast CA, hairy cell leukemia and anemia who was sent to the ER from her PCPs office with an elevated heart rate. EKG in the ER showed sinus tach with HR in the 130's. Associated symptoms include a subjective fever, non-productive cough, generalized weakness and fatigue. The patient reports that her symptoms began 2 days ago. She denies any modifying factors. Patient denies chills, CP, pnd, orthopnea, palpitations, diaphoresis, headache, blurred vision, numbness, tingling, dizziness, localized weakness, n/v/d, abdominal pain, blood in stools, melena, hematemesis, urinary frequency, urgency, dysuria or hematuria. CXR showed silhouetting of the left border of the heart, characteristic a subtle pneumonia. The patient was found to have a WBC of 1.15. The patient is being admitted for treatment of PNA/Sepsis.         Hospital Course:  5/28/19 No acute issues overnight. The patient reports some improvement in symptoms but continues to report a non-productive cough. Platelets are 36K this AM. Will continue to monitor. Hem/onc following. 5/29/19 The patient spiked a temp of 101.8 overnight despite being on ABX, reports increased coughing. WBC decreased to 0.71. Hem/onc following. Will consult ID, the patient may need fungal coverage given immune compromised state. Repeat CXR today.     5/30 Pt was seen and examined at bedside . Last  Night she became more hypoxic  And  CT chest was done  Which show worse PNA. She received 2 lt of NS  Due to low BP  . The  BP  Cont borderline low . The HR  Is above 150 on sinus tachycardia . Pt lactic acid   Trend up to 2.5 . Pt was transferred to  for close monitor .     Interval History:     Review of Systems   Constitutional: Positive for fever. Negative for activity change, appetite change, chills, diaphoresis, fatigue and unexpected weight change.        Generalized weakness and fatigue   HENT: Negative for congestion, drooling, facial swelling, rhinorrhea, sinus pressure, sneezing and sore throat.    Eyes: Negative for discharge, redness, itching and visual disturbance.   Respiratory: Positive for cough and shortness of breath. Negative for apnea, choking, chest tightness, wheezing and stridor.    Cardiovascular: Negative for chest pain, palpitations and leg swelling.   Gastrointestinal: Negative for abdominal distention, abdominal pain, anal bleeding, blood in stool, constipation, diarrhea, nausea and vomiting.   Genitourinary: Negative for decreased urine volume, difficulty urinating, dysuria, frequency, hematuria, pelvic pain, urgency, vaginal bleeding and vaginal discharge.   Musculoskeletal: Negative for arthralgias, back pain, gait problem, joint swelling, myalgias, neck pain and neck stiffness.   Skin: Negative for color change, pallor, rash and wound.   Neurological: Negative for dizziness, seizures, facial asymmetry, speech difficulty, weakness, light-headedness, numbness and headaches.   Psychiatric/Behavioral: Negative for agitation, confusion, hallucinations and suicidal ideas.   All other systems reviewed and are negative.    Objective:     Vital Signs (Most Recent):  Temp: 99.5 °F (37.5 °C) (05/30/19 1130)  Pulse: (!) 160 (05/30/19 1400)  Resp: (!) 32 (05/30/19 1400)  BP: (!) 103/54 (05/30/19 1400)  SpO2: (!) 86 % (05/30/19 1400) Vital Signs (24h Range):  Temp:  [98.6 °F (37 °C)-100 °F (37.8 °C)] 99.5 °F (37.5 °C)  Pulse:  [109-160] 160  Resp:  [18-37] 32  SpO2:  [83 %-94 %] 86 %  BP: ()/() 103/54     Weight: 74.5 kg (164 lb 3.9 oz)  Body mass index is 24.25 kg/m².    Intake/Output  Summary (Last 24 hours) at 5/30/2019 1423  Last data filed at 5/30/2019 1400  Gross per 24 hour   Intake 6128 ml   Output 750 ml   Net 5378 ml      Physical Exam   Constitutional: She is oriented to person, place, and time. She appears well-developed and well-nourished. No distress.   HENT:   Head: Normocephalic and atraumatic.   Right Ear: External ear normal.   Left Ear: External ear normal.   Nose: Nose normal.   Mouth/Throat: Oropharynx is clear and moist. No oropharyngeal exudate.   Eyes: Pupils are equal, round, and reactive to light. Conjunctivae and EOM are normal. Right eye exhibits no discharge. Left eye exhibits no discharge.   Neck: Normal range of motion. Neck supple. No tracheal deviation present. No thyromegaly present.   Cardiovascular: Normal rate, regular rhythm, normal heart sounds and intact distal pulses. Exam reveals no gallop and no friction rub.   No murmur heard.  Pulmonary/Chest: Tachypnea noted. She is in respiratory distress. She has no wheezes. She has no rhonchi. She has rales. She exhibits no tenderness.   Abdominal: Soft. Bowel sounds are normal. She exhibits no distension. There is no tenderness. There is no guarding.   Musculoskeletal: Normal range of motion. She exhibits no edema or tenderness.   Neurological: She is alert and oriented to person, place, and time. She displays normal reflexes. No cranial nerve deficit or sensory deficit. Coordination normal.   Skin: Skin is warm, dry and intact. Capillary refill takes less than 2 seconds. No abrasion, no bruising and no rash noted. No cyanosis. Nails show no clubbing.   Psychiatric: She has a normal mood and affect. Her behavior is normal. Judgment and thought content normal.       Significant Labs: All pertinent labs within the past 24 hours have been reviewed.    Significant Imaging: I have reviewed all pertinent imaging results/findings within the past 24 hours.    Assessment/Plan:      * Sepsis  - Blood cultures pending   - IV  vanc/zosyn  - Lactic acid is negative  - Procalcitonin is 0.23  5/28/19 Platelets are 36K this AM. Will continue to monitor. Hem/onc following.   5/29/19  The patient spiked a temp of 101.8 overnight despite being on ABX, reports increased coughing. WBC decreased to 0.71. Hem/onc following. Will consult ID, the patient may need fungal coverage given immune compromised state. Repeat CXR today.     -will change IVAB to meropenem . Cont vanc and voriconazole  -transfer to ICU       Breast CA  - Hem/onc consulted  - Keep OP follow up with primary Oncologist   5/28/19 Ok to hold tamoxifen    Pancytopenia  - Monitor and transfuse if symptomatic   - CBC in am   5/28/19 Platelets are 36K this AM. Will continue to monitor. Hem/onc following.    5/29/19 WBC decreased to 0.71. Hem/onc following.  Plts 37K  Multifactorial due to sepsis and  H/O Leukemia   Hematology on board     Hairy cell leukemia  - Hem/onc consulted  - Keep OP follow up with primary Oncologist   5/28/19 Ok to hold tamoxifen     Pneumonia of left upper lobe due to infectious organism  - Blood cultures pending   - IV vanc/zosyn  - Neb tx   - Mucinex   - Anti-tussive PRN   - Acapella   5/29/19  The patient spiked a temp of 101.8 overnight despite being on ABX, reports increased coughing. WBC decreased to 0.71. Hem/onc following. Will consult ID, the patient may need fungal coverage given immune compromised state. Repeat CXR today.     -CT chest show Left upper and left lower lobe pneumonia.  There is appears to be a small focal nodular infiltrate in the posterior aspect of the right upper lobe  - IVAB was changed to meropenem       VTE Risk Mitigation (From admission, onward)        Ordered     IP VTE LOW RISK PATIENT  Once      05/27/19 1730     Place sequential compression device  Until discontinued      05/27/19 1730          Critical care time spent on the evaluation and treatment of severe organ dysfunction, review of pertinent labs and imaging studies,  discussions with consulting providers and discussions with patient/family: 45 minutes.    Jermaine Chow MD  Department of Hospital Medicine   [facility]

## 2019-05-30 NOTE — HOSPITAL COURSE
05/30: Transferred to the MICU today for worsening tachycardia and hypotension. Patient not responding to escalation of antibiotic therapy and volume reuscitation. Worsening oxygen requirements. Currently on HFNC 100% at 35 L /min. SAO2 is in the 85%.     05/31: Seen and examined at bedside. Hospital chart reviewed. No acute interval detrimental events noted. Remains intubated and sedated. On 100% FIO2 an PEEP of 15 CM H2O. Oxygenation is marginal. Worsening hemodynamic status in spite of maimum support. IV Neosynephrine has been started as an adjunct to IV nor Epinephrine and Vasopressin. IV Clindamycin has been added. Family updated at bedside.

## 2019-05-30 NOTE — SUBJECTIVE & OBJECTIVE
Interval History: Patient still with fever spikes on IV Vanc/Zoysn. T max last 24 H 101.9. ID consult today.       May 30, 2019: Patient with hypotension, hypoxia, tachycardia this am. Feels SOB. CT chest shows extensive pneumonia. She has been transferred to to ICU. Pulmonology has been consulted. Now on Merrem/ Vancomycin/Voriconazole. On Vapotherm   Oncology Treatment Plan:   [No treatment plan]    Medications:  Continuous Infusions:   sodium chloride 0.9% 150 mL/hr at 05/30/19 1015     Scheduled Meds:   dextromethorphan-guaifenesin  mg  1 tablet Oral BID    meropenem (MERREM) IVPB  500 mg Intravenous Q6H    vancomycin (VANCOCIN) IVPB  1,250 mg Intravenous Q12H    vorconazole (VFEND) IVPB  4 mg/kg Intravenous Q12H     PRN Meds:acetaminophen, albuterol-ipratropium, baclofen, ketorolac, ondansetron, promethazine-codeine 6.25-10 mg/5 ml, sodium chloride 0.9%     Review of patient's allergies indicates:   Allergen Reactions    Adhesive Rash        Past Medical History:   Diagnosis Date    Brain tumor     Hairy cell leukemia 2016     Past Surgical History:   Procedure Laterality Date    APPENDECTOMY      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 Negative for appetite change, chills and diaphoresis.   HENT: Negative for congestion, dental problem, drooling, ear discharge, facial swelling, mouth sores, nosebleeds, sinus pressure and sinus pain.    Eyes: Negative for photophobia, pain, discharge, itching and visual disturbance.   Respiratory: Positive for cough and shortness of breath. Negative for apnea,  choking, chest tightness, wheezing and stridor.    Cardiovascular: Positive for palpitations. Negative for chest pain and leg swelling.   Gastrointestinal: Negative for abdominal distention, abdominal pain, anal bleeding, blood in stool, constipation, diarrhea, nausea and vomiting.   Endocrine: Negative for cold intolerance, heat intolerance, polydipsia and polyphagia.   Genitourinary: Negative for difficulty urinating, dyspareunia, dysuria, flank pain, frequency, pelvic pain and vaginal bleeding.   Musculoskeletal: Positive for myalgias. Negative for arthralgias, back pain, gait problem, joint swelling and neck pain.   Skin: Negative for pallor, rash and wound.   Allergic/Immunologic: Negative for environmental allergies and immunocompromised state.   Neurological: Negative for dizziness, tremors, seizures, facial asymmetry, speech difficulty, light-headedness, numbness and headaches.   Hematological: Negative for adenopathy. Does not bruise/bleed easily.   Psychiatric/Behavioral: Negative for agitation, behavioral problems, confusion, dysphoric mood and hallucinations. The patient is not nervous/anxious and is not hyperactive.      Objective:     Vital Signs (Most Recent):  Temp: 99.6 °F (37.6 °C) (05/30/19 0845)  Pulse: (!) 147 (05/30/19 1237)  Resp: (!) 23 (05/30/19 1237)  BP: (!) 106/57 (05/30/19 1130)  SpO2: (!) 87 % (05/30/19 1237) Vital Signs (24h Range):  Temp:  [98.6 °F (37 °C)-100 °F (37.8 °C)] 99.6 °F (37.6 °C)  Pulse:  [109-150] 147  Resp:  [18-35] 23  SpO2:  [83 %-94 %] 87 %  BP: ()/(45-71) 106/57     Weight: 74.5 kg (164 lb 3.9 oz)  Body mass index is 24.25 kg/m².  Body surface area is 1.9 meters squared.      Intake/Output Summary (Last 24 hours) at 5/30/2019 1255  Last data filed at 5/30/2019 0500  Gross per 24 hour   Intake 4953 ml   Output 750 ml   Net 4203 ml       Physical Exam   Constitutional: She is oriented to person, place, and time. She appears well-developed and well-nourished. No  distress.   Well groomed   HENT:   Head: Normocephalic and atraumatic.   Right Ear: External ear normal.   Left Ear: External ear normal.   Nose: Nose normal.   Mouth/Throat: Oropharynx is clear and moist. No oropharyngeal exudate.   Eyes: Pupils are equal, round, and reactive to light. Conjunctivae and EOM are normal. Right eye exhibits no discharge. Left eye exhibits no discharge.   Neck: Normal range of motion. Neck supple. No tracheal deviation present. No thyromegaly present.   Cardiovascular: Normal rate, regular rhythm, normal heart sounds and intact distal pulses. Exam reveals no gallop and no friction rub.   No murmur heard.  Pulmonary/Chest: Tachypnea noted. She is in respiratory distress. She has no wheezes. She has no rhonchi. She has rales. She exhibits no tenderness.   Abdominal: Soft. Bowel sounds are normal. She exhibits no distension. There is no tenderness. There is no guarding.   Musculoskeletal: Normal range of motion. She exhibits no edema or tenderness.   Neurological: She is alert and oriented to person, place, and time. She displays normal reflexes. No cranial nerve deficit or sensory deficit. Coordination normal.   Skin: Skin is warm, dry and intact. Capillary refill takes less than 2 seconds. No abrasion, no bruising and no rash noted. No cyanosis. Nails show no clubbing.   Psychiatric: She has a normal mood and affect. Her behavior is normal. Judgment and thought content normal.       Significant Labs:   CBC:   Recent Labs   Lab 05/29/19 0310 05/30/19  0524   WBC 0.71* 0.54*   HGB 8.6* 8.6*   HCT 26.0* 26.3*   PLT 37* 44*   , CMP:   Recent Labs   Lab 05/29/19 0310 05/30/19  0524    138   K 3.4* 3.4*    107   CO2 22* 22*   * 100   BUN 10 12   CREATININE 0.8 0.9   CALCIUM 8.2* 7.6*   ANIONGAP 10 9   EGFRNONAA >60 >60   , Coagulation:   No results for input(s): PT, INR, APTT in the last 48 hours., Haptoglobin: No results for input(s): HAPTOGLOBIN in the last 48 hours.,  Immunology: No results for input(s): SPEP, PARMJIT, INDER, FREELAMBDALI in the last 48 hours., LDH: No results for input(s): LDHCSF, BFSOURCE in the last 48 hours. and LFTs:   No results for input(s): ALT, AST, ALKPHOS, BILITOT, PROT, ALBUMIN in the last 48 hours.    Diagnostic Results:  I have reviewed all pertinent imaging results/findings within the past 24 hours.

## 2019-05-30 NOTE — SIGNIFICANT EVENT
Patient seen an examined in response to a deterioration alert. Nursing in the room with concerns due to tachycardia, hypotension and hypoxia. EKG shows sinus tachycardia. CT scan of the chest overnight significant for extensive pneumonia. Plans for trial of Vapotherm as hypoxia may be source of tachycardia. Other considerations include PE. Echocardiogram and lactic acid pending. Plan to transfer to ICU.     Critical Care Time: 45 minutes  Critical secondary to hypotension, tachycardia and hypoxia.  Critical care was time spent personally by me on the following activities: development of treatment plan with patient or surrogate and bedside caregivers, discussions with consultants, evaluation of patient's response to treatment, examination of patient, ordering and performing treatments and interventions, ordering and review of laboratory studies, ordering and review of radiographic studies, pulse oximetry, re-evaluation of patient's condition. This critical care time did not overlap with that of any other provider or involve time for any procedures.

## 2019-05-30 NOTE — SUBJECTIVE & OBJECTIVE
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    HYSTERECTOMY      MASTECTOMY Right        Review of patient's allergies indicates:   Allergen Reactions    Adhesive Rash     Scheduled Meds:   dextromethorphan-guaifenesin  mg  1 tablet Oral BID    meropenem (MERREM) IVPB  500 mg Intravenous Q6H    vancomycin (VANCOCIN) IVPB  1,250 mg Intravenous Q12H    vorconazole (VFEND) IVPB  4 mg/kg Intravenous Q12H     Continuous Infusions:   sodium chloride 0.9% 150 mL/hr at 05/30/19 1300     PRN Meds:.acetaminophen, albuterol-ipratropium, baclofen, ketorolac, ondansetron, promethazine-codeine 6.25-10 mg/5 ml, sodium chloride 0.9%       Family History     Problem Relation (Age of Onset)    Lung cancer Father        Tobacco Use    Smoking status: Never Smoker    Smokeless tobacco: Never Used   Substance and Sexual Activity    Alcohol use: No    Drug use: No    Sexual activity: Not on file         Review of Systems   Constitutional: Positive for fever. Negative for activity change, appetite change, chills, diaphoresis, fatigue and unexpected weight change.        Generalized weakness and fatigue   HENT: Negative for congestion, drooling, facial swelling, rhinorrhea, sinus pressure, sneezing and sore throat.    Eyes: Negative for discharge, redness, itching and visual disturbance.   Respiratory: Positive for cough and shortness of breath. Negative for apnea, choking, chest tightness, wheezing and stridor.    Cardiovascular: Negative for chest pain, palpitations and leg swelling.   Gastrointestinal: Negative for abdominal distention, abdominal pain, anal bleeding, blood in stool, constipation, diarrhea, nausea and vomiting.   Genitourinary: Negative for decreased urine volume, difficulty urinating, dysuria, frequency, hematuria, pelvic pain,  urgency, vaginal bleeding and vaginal discharge.   Musculoskeletal: Negative for arthralgias, back pain, gait problem, joint swelling, myalgias, neck pain and neck stiffness.   Skin: Negative for color change, pallor, rash and wound.   Neurological: Negative for dizziness, seizures, facial asymmetry, speech difficulty, weakness, light-headedness, numbness and headaches.   Psychiatric/Behavioral: Negative for agitation, confusion, hallucinations and suicidal ideas.   All other systems reviewed and are negative.    Objective:     Vital Signs (Most Recent):  Temp: 99.5 °F (37.5 °C) (05/30/19 1130)  Pulse: (!) 152 (05/30/19 1300)  Resp: (!) 30 (05/30/19 1300)  BP: 114/60 (05/30/19 1300)  SpO2: (!) 88 % (05/30/19 1300) Vital Signs (24h Range):  Temp:  [98.6 °F (37 °C)-100 °F (37.8 °C)] 99.5 °F (37.5 °C)  Pulse:  [109-154] 152  Resp:  [18-35] 30  SpO2:  [83 %-94 %] 88 %  BP: ()/() 114/60     Weight: 74.5 kg (164 lb 3.9 oz)  Body mass index is 24.25 kg/m².      Intake/Output Summary (Last 24 hours) at 5/30/2019 1402  Last data filed at 5/30/2019 1300  Gross per 24 hour   Intake 5978 ml   Output 750 ml   Net 5228 ml       Physical Exam   Constitutional: She is oriented to person, place, and time. She appears well-developed and well-nourished. No distress.   HENT:   Head: Normocephalic and atraumatic.   Right Ear: External ear normal.   Left Ear: External ear normal.   Nose: Nose normal.   Mouth/Throat: Oropharynx is clear and moist. No oropharyngeal exudate.   Eyes: Pupils are equal, round, and reactive to light. Conjunctivae and EOM are normal. Right eye exhibits no discharge. Left eye exhibits no discharge.   Neck: Normal range of motion. Neck supple. No tracheal deviation present. No thyromegaly present.   Cardiovascular: Normal rate, regular rhythm, normal heart sounds and intact distal pulses. Exam reveals no gallop and no friction rub.   No murmur heard.  Pulmonary/Chest: Tachypnea noted. She is in  respiratory distress. She has no wheezes. She has no rhonchi. She has rales. She exhibits no tenderness.   Abdominal: Soft. Bowel sounds are normal. She exhibits no distension. There is no tenderness. There is no guarding.   Musculoskeletal: Normal range of motion. She exhibits no edema or tenderness.   Neurological: She is alert and oriented to person, place, and time. She displays normal reflexes. No cranial nerve deficit or sensory deficit. Coordination normal.   Skin: Skin is warm, dry and intact. Capillary refill takes less than 2 seconds. No abrasion, no bruising and no rash noted. No cyanosis. Nails show no clubbing.   Psychiatric: She has a normal mood and affect. Her behavior is normal. Judgment and thought content normal.       Vents:  Oxygen Concentration (%): 85 (05/30/19 1130)    Lines/Drains/Airways     Peripheral Intravenous Line                 Peripheral IV - Single Lumen 05/27/19 1400 20 G Left Antecubital 3 days                Significant Labs:    CBC/Anemia Profile:  Recent Labs   Lab 05/29/19 0310 05/30/19 0524   WBC 0.71* 0.54*   HGB 8.6* 8.6*   HCT 26.0* 26.3*   PLT 37* 44*   MCV 92 92   RDW 16.3* 16.9*        Chemistries:  Recent Labs   Lab 05/29/19 0310 05/29/19 2020 05/30/19 0524     --  138   K 3.4*  --  3.4*     --  107   CO2 22*  --  22*   BUN 10  --  12   CREATININE 0.8  --  0.9   CALCIUM 8.2*  --  7.6*   MG  --  1.6  --    PHOS  --  2.3*  --        BMP:   Recent Labs   Lab 05/29/19 2020 05/30/19 0524   GLU  --  100   NA  --  138   K  --  3.4*   CL  --  107   CO2  --  22*   BUN  --  12   CREATININE  --  0.9   CALCIUM  --  7.6*   MG 1.6  --      Lactic Acid:   Recent Labs   Lab 05/30/19  0811 05/30/19  1150   LACTATE 2.5* 2.6*     Troponin:   Recent Labs   Lab 05/29/19  1128   TROPONINI <0.006         Significant Imaging:     CT Chest Without Contrast: 05/29/19:       There is focal consolidation seen involving the superior segment of the right lower lobe as well as  the medial and posterior basal segments of the left lower lobe.  There is also focal consolidation with air bronchograms in the left upper lobe.  Findings are suspicious for pneumonia.  There is also a small area of focal nodular thickening in the posterior aspect of the right upper lobe which also could be related to a small area of focal pneumonia.  No central masses.    No pleural fluid or pneumothorax.    No adenopathy is identified.    No significant osseous abnormality.    There is a cyst involving the left lobe of the liver measuring about 2.4 cm.    The heart is normal in size.

## 2019-05-30 NOTE — HPI
58 year old woman with history of   breast CA, hairy cell leukemia and anemia who was sent to the ER from her PCPs office with an elevated heart rate( HR in the 130).  There is associated history of fever .Since admission,  chest ct scan showed - focal consolidation seen involving the superior segment of the right lower lobe as well as the medial and posterior basal segments of the left lower lobe.  There is also focal consolidation with air bronchograms in the left upper lobe.  Findings are suspicious for pneumonia.  There is also a small area of focal nodular thickening in the posterior aspect of the right upper lobe which also could be related to a small area of focal pneumonia.  CBC showed -  Component      Latest Ref Rng & Units 5/29/2019 5/28/2019 5/27/2019   WBC      3.90 - 12.70 K/uL 0.71 (LL) 1.06 (LL) 1.15 (LL)

## 2019-05-30 NOTE — ASSESSMENT & PLAN NOTE
IV Vasopressors: NOREPINEPHRINE, VASOPRESSIN ( Non titrating).   Monitor hemodynamics. MAP goal of 65 mmHg.

## 2019-05-30 NOTE — PLAN OF CARE
Problem: Adult Inpatient Plan of Care  Goal: Plan of Care Review  Outcome: Ongoing (interventions implemented as appropriate)  The patient has been sinus tach on the monitor from 115-140 bmp. Upon first assessment pt's O2 sats were 82% on 2 L nasal cannula. Increased nasal cannula to 6 LPM and patient's O2 sats were 87%, patient's HR was 140 BPM, pt SOB, and BP 75/45. Crackled breath sounds. Notified Dr. Centeno and asked to come to bedside. 2L bolus was administered per order and patient was placed on a venti mask with O2 sats between 89-94%. Patient's heart rate remains 115-140 BPM and pt remains hypotensive. Dr. Centeno aware. ABGs were normal. Pt mag was low and patient complaining of back/spasms/pain, administered one time dose of Toradol and magnesium. Pt is resting quietly, will continue to monitor.

## 2019-05-31 PROBLEM — E87.29 METABOLIC ACIDOSIS, INCREASED ANION GAP: Status: ACTIVE | Noted: 2019-01-01

## 2019-05-31 PROBLEM — E87.8 ELECTROLYTE ABNORMALITY: Status: ACTIVE | Noted: 2019-01-01

## 2019-05-31 NOTE — ASSESSMENT & PLAN NOTE
Intubate patient. Initiate Mechanical ventilatory support. ventilator support. Ventilator settings reviewed and adjusted to optimize gas exchange.  Titrate FIO2 to keep SAO2 > 92%. Start NIMBEX. PEEP titration per [facility] protocol. Bronchodilators per protocol.

## 2019-05-31 NOTE — PLAN OF CARE
Problem: Adult Inpatient Plan of Care  Goal: Plan of Care Review  Outcome: Ongoing (interventions implemented as appropriate)  Pt intubated today for continued hypoxia with no improvement with vapotherm.  Pt was awake and alert and consented to intubation and central line placement.  Pt intuabated and central line placed.  Pt remain on high vent support with pulse ox 88-92% MD aware.  Pt remains -140s on the heart monitor.  GI: OG in place with LWIS.  :duggan in place with concentrated yellow urine, POOR urine out put since duggan placed at 1600 total UOP 60ml.  Skin: left hand and FA bruised from lab collection.  Pt turned and repositioned with use of pillows and heels floating.  Right subclavian central line, left radial art line, and PIV intact with no redness, swelling or drainage.   Gtts: Levo, vaso, fentanyl, prop, and IVF infusing.  Jamarcus soft wrist restraints remain in place for pt safety.  Bed low, wheels locked.  Plan of care reviewed with pt  and family throughout the shift.  Pt family verbalizes understanding. Will continue to monitor.

## 2019-05-31 NOTE — ASSESSMENT & PLAN NOTE
05/29- will need close follow up, continue broad spectrum antibiotic as she is an immunocompromised host , will deescalate soon.     05/30-she is now intubated.chest x-ray -showed marked amount of alveolar consolidation throughout the visualized portion of the left lung.  This is characteristic of pneumonia.  Will continue vanco,meropenem and voriconazole follow cultures to guide therapy ,continue vasopressor support

## 2019-05-31 NOTE — ASSESSMENT & PLAN NOTE
05/30- she is now intubated- will follow critical care team, consider PE rule out   Continue vanco,meropenem, on voriconazole   Will follow cultures to guide therapy

## 2019-05-31 NOTE — PROGRESS NOTES
[facility]. Address [address] [place] [address], number [phone].  Pt family request that CJ be notified when body is released to  home.  JAMIR apul- brother- [phone]- must leave voicemail  Pete (spouse) [phone]

## 2019-05-31 NOTE — CHAPLAIN
Follow up note. Pt's family asked for a  to come and do anointing of the sick/last rights.  I was able to contact a  who came and did this for pt and the family.     Chaplain Morris Bauman M.Div., [facility]

## 2019-05-31 NOTE — ASSESSMENT & PLAN NOTE
Hairy cell leukemia diagnosed greater than 5 years ago without previous treatment.   O/P Oncologist is Dr. Gregg.   Heme Onc input appreciated, NEUPOGEN started. .

## 2019-05-31 NOTE — DISCHARGE SUMMARY
Death Summary  Hospital Medicine    Admit Date: 5/27/2019    Time of deah: 05/31/2019    Discharge Attending Physician: Jermaine Chow, *     Cause of death:Septic shock     Diagnoses:  Active Hospital Problems    Diagnosis  POA    *Acute hypoxemic respiratory failure [J96.01]  No    Metabolic acidosis, increased anion gap [E87.2]  Yes    Electrolyte abnormality [E87.8]  No    Moderate malnutrition [E44.0]  Unknown    Shock circulatory [R57.9]  No    Other neutropenia [D70.8]  Yes     Chronic    Severe pneumonia [J18.9]  Yes    Hairy cell leukemia [C91.40]  Yes    Pancytopenia [D61.818]  Yes    Breast CA [C50.919]  Yes    Severe sepsis with acute organ dysfunction [A41.9, R65.20]  Yes      Resolved Hospital Problems   No resolved problems to display.       Hospital Course: Called to bedside. Patient unresponsive, pupils nonreactive, no pulses, spontaneous breathing or responses to pain. No heart sounds or breath sounds heard. Patient pronounced dead at 14:26 Family notified.  5/28/19 No acute issues overnight. The patient reports some improvement in symptoms but continues to report a non-productive cough. Platelets are 36K this AM. Will continue to monitor. Hem/onc following. 5/29/19 The patient spiked a temp of 101.8 overnight despite being on ABX, reports increased coughing. WBC decreased to 0.71. Hem/onc following. Will consult ID, the patient may need fungal coverage given immune compromised state. Repeat CXR today.     5/30 Pt was seen and examined at bedside . Last  Night she became more hypoxic  And  CT chest was done  Which show worse PNA. She received 2 lt of NS  Due to low BP  . The  BP  Cont borderline low . The HR  Is above 150 on sinus tachycardia . Pt lactic acid  Trend up to 2.5 . Pt was transferred to  for close monitor .     5/31 Pt was seen and examined at bedside . She cont on mechanical ventilation and vasopressor x 3 . The PH this am was < 7  And recived bicarb . The Blood  cx is (+) for GPC.  She cont tachycardic .  She is on broad  spectrum IVAB ( vanc, meropenem  and clindamycin ) and antifungal IV . The blood pressure cont  With a MAP < 65 despite 3 vasopressor . Pt was started on hydrocortisone  100 mg iv qid     Significant event   CODE BLUE:  Code blue 14:10:00 -  14:38:00  Cardiac Rhythm PEA - bradycardia  ACLS protocol: Disconnected from ventilator: CPR for 15 minutes: Bag mask ventilation : Epinephrine X 4:   ROSC: No  Pateint pronounced at 14:26: 00   Family was Informed       Consults: Hematology/Oncology, ID , Critical care /pulmonology     Discharged Condition:

## 2019-05-31 NOTE — PROGRESS NOTES
[facility]  Infectious Disease  Progress Note    Patient Name: Alissa Sadler  MRN: [id number]  Admission Date: 5/27/2019  Length of Stay: 4 days  Attending Physician: Jermaine Chow, *  Primary Care Provider: Amador Gay MD    Isolation Status: Neutropenic  Assessment/Plan:      * Acute hypoxemic respiratory failure  05/30- she is now intubated- will follow critical care team, consider PE rule out   Continue vanco,meropenem, on voriconazole   Will follow cultures to guide therapy       Severe sepsis with acute organ dysfunction  05/29- will need close follow up, continue broad spectrum antibiotic as she is an immunocompromised host , will deescalate soon.     05/30-she is now intubated.chest x-ray -showed marked amount of alveolar consolidation throughout the visualized portion of the left lung.  This is characteristic of pneumonia.  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 I will follow-up with patient. Please contact us if you have any additional questions.    Von Lee MD  Infectious Disease  [facility]    Subjective:     Principal Problem:Acute hypoxemic respiratory failure    HPI:   58 year old woman with history of   breast CA, hairy cell leukemia and anemia who was sent to the ER from her PCPs office with an elevated heart rate( HR in the 130).  There is associated history of fever .Since admission,   chest ct scan showed - focal consolidation seen involving the superior segment of the right lower lobe as well as the medial and posterior basal segments of the left lower lobe.  There is also focal consolidation with air bronchograms in the left upper lobe.  Findings are suspicious for  pneumonia.  There is also a small area of focal nodular thickening in the posterior aspect of the right upper lobe which also could be related to a small area of focal pneumonia.  CBC showed -  Component      Latest Ref Rng & Units 5/29/2019 5/28/2019 5/27/2019   WBC      3.90 - 12.70 K/uL 0.71 (LL) 1.06 (LL) 1.15 (LL)     Interval History:   Transferred to ICU for worsening resp failure. She is now intubated.  She is hypoxic and on ventilatory support     Review of Systems   Unable to perform ROS: Intubated     Objective:     Vital Signs (Most Recent):  Temp: 99.3 °F (37.4 °C) (05/30/19 2300)  Pulse: (!) 132 (05/31/19 0300)  Resp: 15 (05/31/19 0300)  BP: (!) 89/73 (05/31/19 0300)  SpO2: 96 % (05/31/19 0300) Vital Signs (24h Range):  Temp:  [99.1 °F (37.3 °C)-100.1 °F (37.8 °C)] 99.3 °F (37.4 °C)  Pulse:  [130-160] 132  Resp:  [14-37] 15  SpO2:  [66 %-100 %] 96 %  BP: ()/() 89/73     Weight: 74.5 kg (164 lb 3.9 oz)  Body mass index is 24.25 kg/m².    Estimated Creatinine Clearance: 50.5 mL/min (based on SCr of 1.3 mg/dL).    Physical Exam   Constitutional: No distress.   HENT:   Head: Normocephalic and atraumatic.   Right Ear: External ear normal.   Left Ear: External ear normal.   Nose: Nose normal.   Mouth/Throat: Oropharynx is clear and moist. No oropharyngeal exudate.   Eyes: Pupils are equal, round, and reactive to light. Conjunctivae and EOM are normal. Right eye exhibits no discharge. Left eye exhibits no discharge.   Neck: Normal range of motion. Neck supple. No tracheal deviation present. No thyromegaly present.   Cardiovascular: Normal rate, regular rhythm, normal heart sounds and intact distal pulses. Exam reveals no gallop and no friction rub.   No murmur heard.  Pulmonary/Chest: Tachypnea noted. She is in respiratory distress. She has no wheezes. She has no rhonchi. She has rales. She exhibits no tenderness.   intubated   Abdominal: Soft. Bowel sounds are normal. She exhibits no distension.  There is no tenderness. There is no guarding.   Musculoskeletal: Normal range of motion. She exhibits no edema or tenderness.   Neurological: She is alert. She displays normal reflexes. No cranial nerve deficit or sensory deficit. Coordination normal.   Intubated and sedated   Skin: Skin is warm, dry and intact. Capillary refill takes less than 2 seconds. No abrasion, no bruising and no rash noted. No cyanosis. Nails show no clubbing.       Significant Labs:   CBC:   Recent Labs   Lab 05/30/19  0524 05/31/19  0551 05/31/19  0551   WBC 0.54* 11.91  --    HGB 8.6* 10.7*  --    HCT 26.3* 34.6* 32*   PLT 44* 82*  --      All pertinent labs within the past 24 hours have been reviewed.    Significant Imaging: I have reviewed all pertinent imaging results/findings within the past 24 hours.

## 2019-05-31 NOTE — ASSESSMENT & PLAN NOTE
IV Vasopressors: NOREPINEPHRINE, VASOPRESSIN ( Non titrating), IV NEOSYNEPHRINE.  Monitor hemodynamics. MAP goal of 65 mmHg.

## 2019-05-31 NOTE — PLAN OF CARE
Problem: Adult Inpatient Plan of Care  Goal: Plan of Care Review  Outcome: Ongoing (interventions implemented as appropriate)  Patient remains intubated and sedated. Dr. Rosa Chacon notified of increased Levophed demand with SBP 80's, labile. 500 mL NS bolus administered. Fentanyl/propofol gtt titrated to maintain RASS -4. Patient withdrawals to pain for majority of shift. UOP 15-20 mL/hr, Dr. Rosa Chacon notified. Patient afebrile. ST on the monitor. Sodium bicarb gtt ordered and initiated secondary to 0400 ABG results  BSWR maintained, no signs of injuries noted. Patient turned every two hours, heels elevated. Plan of care discussed with patient and family at bedside. Will continue to monitor.

## 2019-05-31 NOTE — NURSING
Victor Hugo Shaffer with [facility] Coroners' Office notified of death.  was declined as coroners's case.

## 2019-05-31 NOTE — PROGRESS NOTES
1410-pt PEA-bradycardia, this RN, Alyce RN and Hanh MARION at bedside.  Compressions started, eicu called for code, ACLS started. Epi x4 given.   No ROSC.  TOD called 1426 by Hanh MARION.  MD updated family.

## 2019-05-31 NOTE — SUBJECTIVE & OBJECTIVE
Interval History:   Transferred to ICU for worsening resp failure. She is now intubated.  She is hypoxic and on ventilatory support     Review of Systems   Unable to perform ROS: Intubated     Objective:     Vital Signs (Most Recent):  Temp: 99.3 °F (37.4 °C) (05/30/19 2300)  Pulse: (!) 132 (05/31/19 0300)  Resp: 15 (05/31/19 0300)  BP: (!) 89/73 (05/31/19 0300)  SpO2: 96 % (05/31/19 0300) Vital Signs (24h Range):  Temp:  [99.1 °F (37.3 °C)-100.1 °F (37.8 °C)] 99.3 °F (37.4 °C)  Pulse:  [130-160] 132  Resp:  [14-37] 15  SpO2:  [66 %-100 %] 96 %  BP: ()/() 89/73     Weight: 74.5 kg (164 lb 3.9 oz)  Body mass index is 24.25 kg/m².    Estimated Creatinine Clearance: 50.5 mL/min (based on SCr of 1.3 mg/dL).    Physical Exam   Constitutional: No distress.   HENT:   Head: Normocephalic and atraumatic.   Right Ear: External ear normal.   Left Ear: External ear normal.   Nose: Nose normal.   Mouth/Throat: Oropharynx is clear and moist. No oropharyngeal exudate.   Eyes: Pupils are equal, round, and reactive to light. Conjunctivae and EOM are normal. Right eye exhibits no discharge. Left eye exhibits no discharge.   Neck: Normal range of motion. Neck supple. No tracheal deviation present. No thyromegaly present.   Cardiovascular: Normal rate, regular rhythm, normal heart sounds and intact distal pulses. Exam reveals no gallop and no friction rub.   No murmur heard.  Pulmonary/Chest: Tachypnea noted. She is in respiratory distress. She has no wheezes. She has no rhonchi. She has rales. She exhibits no tenderness.   intubated   Abdominal: Soft. Bowel sounds are normal. She exhibits no distension. There is no tenderness. There is no guarding.   Musculoskeletal: Normal range of motion. She exhibits no edema or tenderness.   Neurological: She is alert. She displays normal reflexes. No cranial nerve deficit or sensory deficit. Coordination normal.   Intubated and sedated   Skin: Skin is warm, dry and intact. Capillary  refill takes less than 2 seconds. No abrasion, no bruising and no rash noted. No cyanosis. Nails show no clubbing.       Significant Labs:   CBC:   Recent Labs   Lab 05/30/19  0524 05/31/19  0551 05/31/19  0551   WBC 0.54* 11.91  --    HGB 8.6* 10.7*  --    HCT 26.3* 34.6* 32*   PLT 44* 82*  --      All pertinent labs within the past 24 hours have been reviewed.    Significant Imaging: I have reviewed all pertinent imaging results/findings within the past 24 hours.

## 2019-05-31 NOTE — ASSESSMENT & PLAN NOTE
Hairy cell leukemia diagnosed greater than 5 years ago without previous treatment.  Suspect that she will need treatment in the near future and she plans to follow up with Dr. padron after discharge to discuss treatment.    May 31, 2019  --WBC 11.9 however, Granulocytes 0% on CBC. Will add Granix in hopes of helping bone marrow produce functioning WBCs. Monitor closely. Daily CBC

## 2019-05-31 NOTE — PROGRESS NOTES
[facility] -   Critical Care Medicine  Progress Note    Patient Name: Alissa Sadler  MRN: [id number]  Admission Date: 5/27/2019  Hospital Length of Stay: 4 days  Code Status: Full Code  Attending Provider: Jermaine Chow, *  Primary Care Provider: Amador Gay MD   Principal Problem: Acute hypoxemic respiratory failure    Subjective:     HPI:  58 year old female who  has a past medical history of Brain tumor, breast cancer, hairy cell leukemia (2016), immune compromise, admitted with severe sepsis secondary to lobar pneumonia.      Hospital/ICU Course:  05/30: Transferred to the MICU today for worsening tachycardia and hypotension. Patient not responding to escalation of antibiotic therapy and volume reuscitation. Worsening oxygen requirements. Currently on HFNC 100% at 35 L /min. SAO2 is in the 85%.     05/31: Seen and examined at bedside. Hospital chart reviewed. No acute interval detrimental events noted. Remains intubated and sedated. On 100% FIO2 an PEEP of 15 CM H2O. Oxygenation is marginal. Worsening hemodynamic status in spite of maimum support. IV Neosynephrine has been started as an adjunct to IV nor Epinephrine and Vasopressin. IV Clindamycin has been added. Family updated at bedside.    Interval History: Seen and examined at bedside. Hospital chart reviewed. No acute interval detrimental events noted. Remains intubated and sedated. On 100% FIO2 an PEEP of 15 CM H2O. Oxygenation is marginal. Worsening hemodynamic status in spite of maimum support. IV Neosynephrine has been started as an adjunct to IV nor Epinephrine and Vasopressin. IV Clindamycin has been added. NEUPOGEN started by oncology. Family updated at bedside.        Review of Systems   Unable to perform ROS: Intubated     Scheduled Meds:   chlorhexidine  15 mL Mouth/Throat BID    clindamycin (CLEOCIN) IVPB  900 mg Intravenous Q8H    famotidine (PF)  20 mg Intravenous BID    lorazepam  2 mg Intravenous Once     meropenem (MERREM) IVPB  500 mg Intravenous Q6H    tbo-filgrastim  480 mcg Subcutaneous Daily    vancomycin (VANCOCIN) IVPB  1,000 mg Intravenous Q12H    vorconazole (VFEND) IVPB  4 mg/kg Intravenous Q12H     Continuous Infusions:   fentanyl 10 mL/hr at 05/31/19 1100    norepinephrine bitartrate-D5W 1 mcg/kg/min (05/31/19 1135)    phenylephrine 4 mcg/kg/min (05/31/19 1135)    propofol 5 mcg/kg/min (05/31/19 1100)    sodium bicarbonate drip 150 mL/hr at 05/31/19 1100    vasopressin (PITRESSIN) infusion 0.04 Units/min (05/31/19 1100)     PRN Meds:.acetaminophen, albuterol-ipratropium, baclofen, ketorolac, ondansetron, promethazine-codeine 6.25-10 mg/5 ml, sodium chloride 0.9%       Objective:     Vital Signs (Most Recent):  Temp: 100.2 °F (37.9 °C) (05/31/19 1105)  Pulse: (!) 142 (05/31/19 1149)  Resp: (!) 27 (05/31/19 1149)  BP: (!) 42/25 (05/31/19 1105)  SpO2: (!) 84 % (05/31/19 1149) Vital Signs (24h Range):  Temp:  [98.9 °F (37.2 °C)-100.2 °F (37.9 °C)] 100.2 °F (37.9 °C)  Pulse:  [130-160] 142  Resp:  [14-37] 27  SpO2:  [66 %-100 %] 84 %  BP: ()/() 42/25     Weight: 74.5 kg (164 lb 3.9 oz)  Body mass index is 24.25 kg/m².      Intake/Output Summary (Last 24 hours) at 5/31/2019 1218  Last data filed at 5/31/2019 1130  Gross per 24 hour   Intake 6449.82 ml   Output 368 ml   Net 6081.82 ml       Physical Exam   Constitutional: She appears well-developed. No distress. She is sedated and intubated.   HENT:   Head: Normocephalic and atraumatic.   Right Ear: External ear normal.   Left Ear: External ear normal.   Nose: Nose normal.   Mouth/Throat: Oropharynx is clear and moist. No oropharyngeal exudate.   Eyes: Pupils are equal, round, and reactive to light. Conjunctivae and EOM are normal. Right eye exhibits no discharge. Left eye exhibits no discharge.   Neck: Normal range of motion. Neck supple. No tracheal deviation present. No thyromegaly present.   Cardiovascular: Normal rate, regular  rhythm, normal heart sounds and intact distal pulses. Exam reveals no gallop and no friction rub.   No murmur heard.  Pulmonary/Chest: No tachypnea. She is intubated. No respiratory distress. She has no wheezes. She has no rhonchi. She has rales in the left upper field, the left middle field and the left lower field. She exhibits no tenderness.   Abdominal: Soft. Bowel sounds are normal. She exhibits no distension. There is no tenderness. There is no guarding.   Musculoskeletal: Normal range of motion. She exhibits no edema or tenderness.   Neurological: She displays normal reflexes. No cranial nerve deficit or sensory deficit. Coordination normal.   Skin: Skin is warm, dry and intact. Capillary refill takes less than 2 seconds. No abrasion, no bruising and no rash noted. No cyanosis. Nails show no clubbing.   Psychiatric: She has a normal mood and affect. Her behavior is normal. Judgment and thought content normal.       Vents:  Vent Mode: A/C (05/31/19 1149)  Ventilator Initiated: Yes (05/30/19 1531)  Set Rate: 28 bmp (05/31/19 1149)  Vt Set: 400 mL (05/31/19 1149)  Pressure Support: 0 cmH20 (05/31/19 1149)  PEEP/CPAP: 15 cmH20 (05/31/19 1149)  Oxygen Concentration (%): 100 (05/31/19 1149)  Peak Airway Pressure: 29 cmH2O (05/31/19 1149)  Plateau Pressure: 0 cmH20 (05/31/19 1149)  Total Ve: 11.7 mL (05/31/19 1149)  F/VT Ratio<105 (RSBI): (!) 64.75 (05/31/19 1149)    Lines/Drains/Airways     Central Venous Catheter Line                 Percutaneous Central Line Insertion/Assessment - triple lumen  05/30/19 1600 right subclavian less than 1 day          Drain                 Urethral Catheter 05/30/19 1614 Double-lumen less than 1 day          Airway                 Airway - Non-Surgical 05/30/19 1430 Endotracheal Tube less than 1 day          Arterial Line                 Arterial Line 05/30/19 1600 Left Radial less than 1 day          Peripheral Intravenous Line                 Peripheral IV - Single Lumen 05/27/19  1400 20 G Left Antecubital 3 days                Significant Labs:    CBC/Anemia Profile:  Recent Labs   Lab 05/30/19 0524 05/31/19 0551 05/31/19  0551   WBC 0.54* 11.91  --    HGB 8.6* 10.7*  --    HCT 26.3* 34.6* 32*   PLT 44* 82*  --    MCV 92 96  --    RDW 16.9* 17.9*  --         Chemistries:  Recent Labs   Lab 05/29/19 2020 05/30/19 0524 05/31/19 0414   NA  --  138 138   K  --  3.4* 3.3*   CL  --  107 115*   CO2  --  22* 15*   BUN  --  12 18   CREATININE  --  0.9 1.3   CALCIUM  --  7.6* 5.2*   MG 1.6  --   --    PHOS 2.3*  --   --        ABGs:   Recent Labs   Lab 05/31/19  0551   PH 7.135*   PCO2 70.3*   HCO3 23.7*   POCSATURATED 65*   BE -5     Lactic Acid:   Recent Labs   Lab 05/30/19  1150 05/30/19 2006 05/31/19 0414   LACTATE 2.6* 0.9 0.8     POCT Glucose:   Recent Labs   Lab 05/31/19  1115   POCTGLUCOSE 159*     Respiratory Culture:   Recent Labs   Lab 05/30/19  1717   GSRESP <10 epithelial cells per low power field.  Rare WBC's  No organisms seen         Significant Imaging:    Chest x-ray:Tubes and lines are stable. Diffuse interstitial alveolar opacities throughout the left lung consistent with diffuse airspace disease or aspiration.  Small to moderate left-sided pleural effusion suspected.  Right lung is clear.  In comparison to the prior study, there is no additional interval changes.        ABG  Recent Labs   Lab 05/31/19  1251   PH 7.003*   PO2 41*   PCO2 70.2*   HCO3 17.4*   BE -14     Assessment/Plan:       I have reviewed all labs and imaging studies and compared to previous results. I have also discussed labs with all the teams in the medical care of the patient and my plan is outlined below     Neuro    SHORT TERM SEDATION:     []        Midazolam  [x]        Propofol  []        Dexmedetomidine     ANALGESIA:     []        Morphine   [x]        Fentanyl  []        Hydromorphone.         RASS  => - 4     Neurochecks per routine.  Daily sedation vacation    Pulmonary  * Acute hypoxemic  respiratory failure  Intubate patient. Initiate Mechanical ventilatory support. ventilator support. Ventilator settings reviewed and adjusted to optimize gas exchange. Daily wake up and breathe trials.  Titrate FIO2 to keep SAO2 > 92%. PEEP titration per ARDSNET protocol. Bronchodilators per protocol.    Severe pneumonia  Empiric broad spectrum antibiotics.   IV MEROPENEM, VANCOMYCIN, ZYVOX, CLINDAMYCIN.     Renal/  Monitor BUN / Cr. Montor urine output.    Electrolyte abnormality  Monitor and replete as needed.   [ K+ and Ca ++]    Metabolic acidosis, increased anion gap  IV Bicarbonate gtt.     ID  Severe sepsis with acute organ dysfunction    Vitals:    05/31/19 0900 05/31/19 0949 05/31/19 1105 05/31/19 1149   BP: (!) 63/11  (!) 42/25    Pulse: (!) 138  (!) 143 (!) 142   Resp: (!) 29  (!) 28 (!) 27   Temp:   100.2 °F (37.9 °C)    TempSrc:   Oral    SpO2:  (!) 88% (!) 85% (!) 84%   Weight:       Height:             ID consulted. Appreciate ID input.     [x] Respiratory rate >20 breaths/min or PaCO2 <32 mmHg   []  WBC >12,000 cells/mm3, <4000 cells/mm3, or >10 percent immature (band) forms  [x] Heart rate >90 beats/min   [] Temperature >38ºC or <36ºC    Microbiology: Panculture.   Oxygenation: Mechanical assisted ventilation. Keep SAO2 > = 92%  Hemodynamics: IV Norepinephrine. IV Vasopressin. IV Phenylephrine IV crystalloids. Keep MAP > = 65mmHg  Source control: IV Meropenem, IV Vancomycin, IV Voriconazole, IV Clindamycin.     Oncology  Other neutropenia  NEUPOGEN per Oncology  Secondary to HCL.  Neutropenia precautions.  Heme Onc Consulted. Await input.      Breast CA  Hem/onc input appreciated. TAMOXIFEN on hold.     Pancytopenia  Monitor hemogram. Transfuse blood and blood products as needed.    Hairy cell leukemia  Hairy cell leukemia diagnosed greater than 5 years ago without previous treatment.   O/P Oncologist is Dr. Gregg.   Heme Onc input appreciated, NEUPOGEN started. .           Endocrine  Moderate  malnutrition  Tube feeding per RD recommendation. Start on Vent day 3.     GI  NPO for now. Tube feeding will be initiated per RD recommendations. Stress ulcer precautions.     Other  Shock circulatory   IV Vasopressors: NOREPINEPHRINE, VASOPRESSIN ( Non titrating), IV NEOSYNEPHRINE.  Monitor hemodynamics. MAP goal of 65 mmHg.       Critical Care Daily Checklist:    A: Awake: RASS Goal/Actual Goal: RASS Goal: -4-->deep sedation  Actual: [place] Agitation Sedation Scale (RASS): Unarousable   B: Spontaneous Breathing Trial Performed?   N/A   C: SAT & SBT Coordinated?  N/A                     D: Delirium: CAM-ICU Overall CAM-ICU: Positive   E: Early Mobility Performed? No   F: Feeding Goal: Goals: Meet > 85 % EEN/EPN while admitted  Status: Nutrition Goal Status: new   Current Diet Order   Procedures    Diet NPO      AS: Analgesia/Sedation FENTANYL, PROPOFOL   T: Thromboembolic Prophylaxis SCD   H: HOB > 300 Yes   U: Stress Ulcer Prophylaxis (if needed) Pantoprazole   G: Glucose Control SBGM   B: Bowel Function Stool Occurrence: 1   I: Indwelling Catheter (Lines & Rubin) Necessity Yes   D: De-escalation of Antimicrobials/Pharmacotherapies Yes    Plan for the day/ETD Continue current    Code Status:  Family/Goals of Care: Full Code  Pending course     Critical Care Time: 90 minutes  Critical secondary to Patient has a condition that poses threat to life and bodily function: ACUTE RESPIRATORY FAILURE,. SEVERE SEPSIS, CIRCULATORY SHOCK,SEVERE PNEUMONIA       Critical care was time spent personally by me on the following activities: development of treatment plan with patient or surrogate and bedside caregivers, discussions with consultants, evaluation of patient's response to treatment, examination of patient, ordering and performing treatments and interventions, ordering and review of laboratory studies, ordering and review of radiographic studies, pulse oximetry, re-evaluation of patient's condition. This critical care  time did not overlap with that of any other provider or involve time for any procedures.     Andrea Schafer MD  Critical Care Medicine  [facility]

## 2019-05-31 NOTE — SIGNIFICANT EVENT
CODE BLUE:    Code blue 14:10:00 -  14:38:00  Cardiac Rhythm PEA - bradycardia    ACLS protocol: Disconnected from ventilator: CPR for 15 minutes: Bag mask ventilation : Epinephrine X 4:     ROSC: No      Pateint pronounced at 14:26: 00    Family informed.

## 2019-05-31 NOTE — PROGRESS NOTES
RT called to bedside during code blue. Patient Bagged with 100% / +15 PEEP.  Patient had copious amount of blood secretions.

## 2019-05-31 NOTE — ASSESSMENT & PLAN NOTE
NEUPOGEN per Oncology  Secondary to HCL.  Neutropenia precautions.  Heme Onc Consulted. Await input.

## 2019-05-31 NOTE — SUBJECTIVE & OBJECTIVE
Interval History: Seen and examined at bedside. Hospital chart reviewed. No acute interval detrimental events noted. Remains intubated and sedated. On 100% FIO2 an PEEP of 15 CM H2O. Oxygenation is marginal. Worsening hemodynamic status in spite of maimum support. IV Neosynephrine has been started as an adjunct to IV nor Epinephrine and Vasopressin. IV Clindamycin has been added. NEUPOGEN started by oncology. Family updated at bedside.        Review of Systems   Unable to perform ROS: Intubated     Scheduled Meds:   chlorhexidine  15 mL Mouth/Throat BID    clindamycin (CLEOCIN) IVPB  900 mg Intravenous Q8H    famotidine (PF)  20 mg Intravenous BID    lorazepam  2 mg Intravenous Once    meropenem (MERREM) IVPB  500 mg Intravenous Q6H    tbo-filgrastim  480 mcg Subcutaneous Daily    vancomycin (VANCOCIN) IVPB  1,000 mg Intravenous Q12H    vorconazole (VFEND) IVPB  4 mg/kg Intravenous Q12H     Continuous Infusions:   fentanyl 10 mL/hr at 05/31/19 1100    norepinephrine bitartrate-D5W 1 mcg/kg/min (05/31/19 1135)    phenylephrine 4 mcg/kg/min (05/31/19 1135)    propofol 5 mcg/kg/min (05/31/19 1100)    sodium bicarbonate drip 150 mL/hr at 05/31/19 1100    vasopressin (PITRESSIN) infusion 0.04 Units/min (05/31/19 1100)     PRN Meds:.acetaminophen, albuterol-ipratropium, baclofen, ketorolac, ondansetron, promethazine-codeine 6.25-10 mg/5 ml, sodium chloride 0.9%       Objective:     Vital Signs (Most Recent):  Temp: 100.2 °F (37.9 °C) (05/31/19 1105)  Pulse: (!) 142 (05/31/19 1149)  Resp: (!) 27 (05/31/19 1149)  BP: (!) 42/25 (05/31/19 1105)  SpO2: (!) 84 % (05/31/19 1149) Vital Signs (24h Range):  Temp:  [98.9 °F (37.2 °C)-100.2 °F (37.9 °C)] 100.2 °F (37.9 °C)  Pulse:  [130-160] 142  Resp:  [14-37] 27  SpO2:  [66 %-100 %] 84 %  BP: ()/() 42/25     Weight: 74.5 kg (164 lb 3.9 oz)  Body mass index is 24.25 kg/m².      Intake/Output Summary (Last 24 hours) at 5/31/2019 1218  Last data filed at  5/31/2019 1130  Gross per 24 hour   Intake 6449.82 ml   Output 368 ml   Net 6081.82 ml       Physical Exam   Constitutional: She appears well-developed. No distress. She is sedated and intubated.   HENT:   Head: Normocephalic and atraumatic.   Right Ear: External ear normal.   Left Ear: External ear normal.   Nose: Nose normal.   Mouth/Throat: Oropharynx is clear and moist. No oropharyngeal exudate.   Eyes: Pupils are equal, round, and reactive to light. Conjunctivae and EOM are normal. Right eye exhibits no discharge. Left eye exhibits no discharge.   Neck: Normal range of motion. Neck supple. No tracheal deviation present. No thyromegaly present.   Cardiovascular: Normal rate, regular rhythm, normal heart sounds and intact distal pulses. Exam reveals no gallop and no friction rub.   No murmur heard.  Pulmonary/Chest: No tachypnea. She is intubated. No respiratory distress. She has no wheezes. She has no rhonchi. She has rales in the left upper field, the left middle field and the left lower field. She exhibits no tenderness.   Abdominal: Soft. Bowel sounds are normal. She exhibits no distension. There is no tenderness. There is no guarding.   Musculoskeletal: Normal range of motion. She exhibits no edema or tenderness.   Neurological: She displays normal reflexes. No cranial nerve deficit or sensory deficit. Coordination normal.   Skin: Skin is warm, dry and intact. Capillary refill takes less than 2 seconds. No abrasion, no bruising and no rash noted. No cyanosis. Nails show no clubbing.   Psychiatric: She has a normal mood and affect. Her behavior is normal. Judgment and thought content normal.       Vents:  Vent Mode: A/C (05/31/19 1149)  Ventilator Initiated: Yes (05/30/19 1531)  Set Rate: 28 bmp (05/31/19 1149)  Vt Set: 400 mL (05/31/19 1149)  Pressure Support: 0 cmH20 (05/31/19 1149)  PEEP/CPAP: 15 cmH20 (05/31/19 1149)  Oxygen Concentration (%): 100 (05/31/19 1149)  Peak Airway Pressure: 29 cmH2O (05/31/19  1149)  Plateau Pressure: 0 cmH20 (05/31/19 1149)  Total Ve: 11.7 mL (05/31/19 1149)  F/VT Ratio<105 (RSBI): (!) 64.75 (05/31/19 1149)    Lines/Drains/Airways     Central Venous Catheter Line                 Percutaneous Central Line Insertion/Assessment - triple lumen  05/30/19 1600 right subclavian less than 1 day          Drain                 Urethral Catheter 05/30/19 1614 Double-lumen less than 1 day          Airway                 Airway - Non-Surgical 05/30/19 1430 Endotracheal Tube less than 1 day          Arterial Line                 Arterial Line 05/30/19 1600 Left Radial less than 1 day          Peripheral Intravenous Line                 Peripheral IV - Single Lumen 05/27/19 1400 20 G Left Antecubital 3 days                Significant Labs:    CBC/Anemia Profile:  Recent Labs   Lab 05/30/19 0524 05/31/19 0551 05/31/19  0551   WBC 0.54* 11.91  --    HGB 8.6* 10.7*  --    HCT 26.3* 34.6* 32*   PLT 44* 82*  --    MCV 92 96  --    RDW 16.9* 17.9*  --         Chemistries:  Recent Labs   Lab 05/29/19 2020 05/30/19 0524 05/31/19  0414   NA  --  138 138   K  --  3.4* 3.3*   CL  --  107 115*   CO2  --  22* 15*   BUN  --  12 18   CREATININE  --  0.9 1.3   CALCIUM  --  7.6* 5.2*   MG 1.6  --   --    PHOS 2.3*  --   --        ABGs:   Recent Labs   Lab 05/31/19  0551   PH 7.135*   PCO2 70.3*   HCO3 23.7*   POCSATURATED 65*   BE -5     Lactic Acid:   Recent Labs   Lab 05/30/19 1150 05/30/19 2006 05/31/19 0414   LACTATE 2.6* 0.9 0.8     POCT Glucose:   Recent Labs   Lab 05/31/19  1115   POCTGLUCOSE 159*     Respiratory Culture:   Recent Labs   Lab 05/30/19  1717   GSRESP <10 epithelial cells per low power field.  Rare WBC's  No organisms seen         Significant Imaging:    Chest x-ray:Tubes and lines are stable. Diffuse interstitial alveolar opacities throughout the left lung consistent with diffuse airspace disease or aspiration.  Small to moderate left-sided pleural effusion suspected.  Right lung is  clear.  In comparison to the prior study, there is no additional interval changes.

## 2019-05-31 NOTE — EICU
Called for critical value     ABG    6.95/99/48/21    Will DC NS infusion and start NaHCO3 infusion as well as blous 8.4% 100 meq    Will also increase RR on Vent to 28/min      At 6:16 am called for Ca = 5.2 ( do not have an albumin)Will order Ca gluconate hoping it might increase her BP to help her kidney perfusion    RN reminded not to give the Cagluconate via the NaHCO3 line.

## 2019-05-31 NOTE — SUBJECTIVE & OBJECTIVE
Interval History: Patient still with fever spikes on IV Vanc/Zoysn. T max last 24 H 101.9. ID consult today.       May 30, 2019: Patient with hypotension, hypoxia, tachycardia this am. Feels SOB. CT chest shows extensive pneumonia. She has been transferred to to ICU. Pulmonology has been consulted. Now on Merrem/ Vancomycin/Voriconazole. On Vapotherm     May 31, 2019: Patient is intubated, on pressors and IV sedation. Remains hypotensive, tachycardic, hypoxic. Very little UOP. Fever curve has improved.   Oncology Treatment Plan:   [No treatment plan]    Medications:  Continuous Infusions:   fentanyl 10 mL/hr at 05/31/19 1100    norepinephrine bitartrate-D5W 1 mcg/kg/min (05/31/19 1135)    phenylephrine 4 mcg/kg/min (05/31/19 1135)    propofol 5 mcg/kg/min (05/31/19 1100)    sodium bicarbonate drip 150 mL/hr at 05/31/19 1100    vasopressin (PITRESSIN) infusion 0.04 Units/min (05/31/19 1100)     Scheduled Meds:   chlorhexidine  15 mL Mouth/Throat BID    clindamycin (CLEOCIN) IVPB  900 mg Intravenous Q8H    famotidine (PF)  20 mg Intravenous BID    lorazepam  2 mg Intravenous Once    meropenem (MERREM) IVPB  500 mg Intravenous Q6H    tbo-filgrastim  480 mcg Subcutaneous Daily    vancomycin (VANCOCIN) IVPB  1,000 mg Intravenous Q12H    vorconazole (VFEND) IVPB  4 mg/kg Intravenous Q12H     PRN Meds:acetaminophen, albuterol-ipratropium, baclofen, ketorolac, ondansetron, promethazine-codeine 6.25-10 mg/5 ml, sodium chloride 0.9%     Review of patient's allergies indicates:   Allergen Reactions    Adhesive Rash        Past Medical History:   Diagnosis Date    Brain tumor     Hairy cell leukemia 2016     Past Surgical History:   Procedure Laterality Date    APPENDECTOMY      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 Negative for appetite change, chills and diaphoresis.   HENT: Negative for congestion, dental problem, drooling, ear discharge, facial swelling, mouth sores, nosebleeds, sinus pressure and sinus pain.    Eyes: Negative for photophobia, pain, discharge, itching and visual disturbance.   Respiratory: Positive for cough and shortness of breath. Negative for apnea, choking, chest tightness, wheezing and stridor.    Cardiovascular: Positive for palpitations. Negative for chest pain and leg swelling.   Gastrointestinal: Negative for abdominal distention, abdominal pain, anal bleeding, blood in stool, constipation, diarrhea, nausea and vomiting.   Endocrine: Negative for cold intolerance, heat intolerance, polydipsia and polyphagia.   Genitourinary: Negative for difficulty urinating, dyspareunia, dysuria, flank pain, frequency, pelvic pain and vaginal bleeding.   Musculoskeletal: Positive for myalgias. Negative for arthralgias, back pain, gait problem, joint swelling and neck pain.   Skin: Negative for pallor, rash and wound.   Allergic/Immunologic: Negative for environmental allergies and immunocompromised state.   Neurological: Negative for dizziness, tremors, seizures, facial asymmetry, speech difficulty, light-headedness, numbness and headaches.   Hematological: Negative for adenopathy. Does not bruise/bleed easily.   Psychiatric/Behavioral: Negative for agitation, behavioral problems, confusion, dysphoric mood and hallucinations. The patient is not nervous/anxious and is not hyperactive.      Objective:     Vital Signs (Most Recent):  Temp: 100.2 °F (37.9 °C) (05/31/19 1105)  Pulse: (!) 143 (05/31/19 1105)  Resp: (!) 28 (05/31/19 1105)  BP: (!) 42/25 (05/31/19 1105)  SpO2: (!) 85 % (05/31/19 1105)  Vital Signs (24h Range):  Temp:  [98.9 °F (37.2 °C)-100.2 °F (37.9 °C)] 100.2 °F (37.9 °C)  Pulse:  [130-160] 143  Resp:  [14-37] 28  SpO2:  [66 %-100 %] 85 %  BP: ()/() 42/25     Weight: 74.5 kg (164 lb 3.9 oz)  Body mass index is 24.25 kg/m².  Body surface area is 1.9 meters squared.      Intake/Output Summary (Last 24 hours) at 5/31/2019 1149  Last data filed at 5/31/2019 1130  Gross per 24 hour   Intake 6599.82 ml   Output 368 ml   Net 6231.82 ml       Physical Exam   Constitutional: She is oriented to person, place, and time. She appears well-developed and well-nourished. No distress.   Well groomed   HENT:   Head: Normocephalic and atraumatic.   Right Ear: External ear normal.   Left Ear: External ear normal.   Nose: Nose normal.   Mouth/Throat: Oropharynx is clear and moist. No oropharyngeal exudate.   Eyes: Pupils are equal, round, and reactive to light. Conjunctivae and EOM are normal. Right eye exhibits no discharge. Left eye exhibits no discharge.   Neck: Normal range of motion. Neck supple. No tracheal deviation present. No thyromegaly present.   Cardiovascular: Normal rate, regular rhythm, normal heart sounds and intact distal pulses. Exam reveals no gallop and no friction rub.   No murmur heard.  Pulmonary/Chest: Tachypnea noted. She is in respiratory distress. She has no wheezes. She has no rhonchi. She has rales. She exhibits no tenderness.   Abdominal: Soft. Bowel sounds are normal. She exhibits no distension. There is no tenderness. There is no guarding.   Musculoskeletal: Normal range of motion. She exhibits no edema or tenderness.   Neurological: She is alert and oriented to person, place, and time. She displays normal reflexes. No cranial nerve deficit or sensory deficit. Coordination normal.   Skin: Skin is warm, dry and intact. Capillary refill takes less than 2 seconds. No abrasion, no bruising and no rash noted. No cyanosis. Nails show no clubbing.   Psychiatric: She has a normal  mood and affect. Her behavior is normal. Judgment and thought content normal.       Significant Labs:   CBC:   Recent Labs   Lab 05/30/19  0524 05/31/19  0551 05/31/19  0551   WBC 0.54* 11.91  --    HGB 8.6* 10.7*  --    HCT 26.3* 34.6* 32*   PLT 44* 82*  --    , CMP:   Recent Labs   Lab 05/30/19  0524 05/31/19  0414    138   K 3.4* 3.3*    115*   CO2 22* 15*    88   BUN 12 18   CREATININE 0.9 1.3   CALCIUM 7.6* 5.2*   ANIONGAP 9 8   EGFRNONAA >60 46*   , Coagulation:   No results for input(s): PT, INR, APTT in the last 48 hours., Haptoglobin: No results for input(s): HAPTOGLOBIN in the last 48 hours., Immunology: No results for input(s): SPEP, PARMJIT, INDER, FREELAMBDALI in the last 48 hours., LDH: No results for input(s): LDHCSF, BFSOURCE in the last 48 hours. and LFTs:   No results for input(s): ALT, AST, ALKPHOS, BILITOT, PROT, ALBUMIN in the last 48 hours.    Diagnostic Results:  I have reviewed all pertinent imaging results/findings within the past 24 hours.

## 2019-05-31 NOTE — CHAPLAIN
Initial visit with patient and family.  I was called by pt's nurse who mentioned that pt was not doing well and the family could use support.  I went to the room and took time to visit with pt's spouse and other family.  They have requested anointing of the sick and I have contacted one of the local [place]  and am waiting for a response.  The current plan is to have a  here for 2:00pm.  I will follow up and communicate with the family throughout the day and throughout pt's time in the hospital.    Chaplain Morris Bauman M.Div., [facility]

## 2019-05-31 NOTE — PROGRESS NOTES
[facility] -   Hematology/Oncology  Progress Note    Patient Name: Alissa Sadler  Admission Date: 5/27/2019  Hospital Length of Stay: 4 days  Code Status: Full Code     Subjective:     HPI:      58-year-old female with history of DCIS and hairy cell leukemia diagnosed in approximately 2012.  She has not required treatment, however, her counts have slowly declined over the previous 5 years.  She was sent to the emergency room from her primary care doctor with tachycardia/fever which had been present for the previous 48 hr.  Chest x-ray was consistent with pneumonia and she was admitted for pneumonia/sepsis    Interval History: Patient still with fever spikes on IV Vanc/Zoysn. T max last 24 H 101.9. ID consult today.       May 30, 2019: Patient with hypotension, hypoxia, tachycardia this am. Feels SOB. CT chest shows extensive pneumonia. She has been transferred to to ICU. Pulmonology has been consulted. Now on Merrem/ Vancomycin/Voriconazole. On Vapotherm     May 31, 2019: Patient is intubated, on pressors and IV sedation. Remains hypotensive, tachycardic, hypoxic. Very little UOP. Fever curve has improved.   Oncology Treatment Plan:   [No treatment plan]    Medications:  Continuous Infusions:   fentanyl 10 mL/hr at 05/31/19 1100    norepinephrine bitartrate-D5W 1 mcg/kg/min (05/31/19 1135)    phenylephrine 4 mcg/kg/min (05/31/19 1135)    propofol 5 mcg/kg/min (05/31/19 1100)    sodium bicarbonate drip 150 mL/hr at 05/31/19 1100    vasopressin (PITRESSIN) infusion 0.04 Units/min (05/31/19 1100)     Scheduled Meds:   chlorhexidine  15 mL Mouth/Throat BID    clindamycin (CLEOCIN) IVPB  900 mg Intravenous Q8H    famotidine (PF)  20 mg Intravenous BID    lorazepam  2 mg Intravenous Once    meropenem (MERREM) IVPB  500 mg Intravenous Q6H    tbo-filgrastim  480 mcg Subcutaneous Daily    vancomycin (VANCOCIN) IVPB  1,000 mg Intravenous Q12H    vorconazole (VFEND) IVPB  4 mg/kg  Intravenous Q12H     PRN Meds:acetaminophen, albuterol-ipratropium, baclofen, ketorolac, ondansetron, promethazine-codeine 6.25-10 mg/5 ml, sodium chloride 0.9%     Review of patient's allergies indicates:   Allergen Reactions    Adhesive Rash        Past Medical History:   Diagnosis Date    Brain tumor     Hairy cell leukemia 2016     Past Surgical History:   Procedure Laterality Date    APPENDECTOMY      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 Negative for appetite change, chills and diaphoresis.   HENT: Negative for congestion, dental problem, drooling, ear discharge, facial swelling, mouth sores, nosebleeds, sinus pressure and sinus pain.    Eyes: Negative for photophobia, pain, discharge, itching and visual disturbance.   Respiratory: Positive for cough and shortness of breath. Negative for apnea, choking, chest tightness, wheezing and stridor.    Cardiovascular: Positive for palpitations. Negative for chest pain and leg swelling.   Gastrointestinal: Negative for abdominal distention, abdominal pain, anal bleeding, blood in stool, constipation, diarrhea, nausea and vomiting.   Endocrine: Negative for cold intolerance, heat intolerance, polydipsia and polyphagia.   Genitourinary: Negative for difficulty urinating, dyspareunia, dysuria, flank pain, frequency, pelvic pain and vaginal bleeding.   Musculoskeletal: Positive for myalgias. Negative for arthralgias, back pain, gait problem, joint swelling and neck pain.   Skin: Negative for pallor, rash and wound.   Allergic/Immunologic: Negative for environmental  allergies and immunocompromised state.   Neurological: Negative for dizziness, tremors, seizures, facial asymmetry, speech difficulty, light-headedness, numbness and headaches.   Hematological: Negative for adenopathy. Does not bruise/bleed easily.   Psychiatric/Behavioral: Negative for agitation, behavioral problems, confusion, dysphoric mood and hallucinations. The patient is not nervous/anxious and is not hyperactive.      Objective:     Vital Signs (Most Recent):  Temp: 100.2 °F (37.9 °C) (05/31/19 1105)  Pulse: (!) 143 (05/31/19 1105)  Resp: (!) 28 (05/31/19 1105)  BP: (!) 42/25 (05/31/19 1105)  SpO2: (!) 85 % (05/31/19 1105) Vital Signs (24h Range):  Temp:  [98.9 °F (37.2 °C)-100.2 °F (37.9 °C)] 100.2 °F (37.9 °C)  Pulse:  [130-160] 143  Resp:  [14-37] 28  SpO2:  [66 %-100 %] 85 %  BP: ()/() 42/25     Weight: 74.5 kg (164 lb 3.9 oz)  Body mass index is 24.25 kg/m².  Body surface area is 1.9 meters squared.      Intake/Output Summary (Last 24 hours) at 5/31/2019 1149  Last data filed at 5/31/2019 1130  Gross per 24 hour   Intake 6599.82 ml   Output 368 ml   Net 6231.82 ml       Physical Exam   Constitutional: She is oriented to person, place, and time. She appears well-developed and well-nourished. No distress.   Well groomed   HENT:   Head: Normocephalic and atraumatic.   Right Ear: External ear normal.   Left Ear: External ear normal.   Nose: Nose normal.   Mouth/Throat: Oropharynx is clear and moist. No oropharyngeal exudate.   Eyes: Pupils are equal, round, and reactive to light. Conjunctivae and EOM are normal. Right eye exhibits no discharge. Left eye exhibits no discharge.   Neck: Normal range of motion. Neck supple. No tracheal deviation present. No thyromegaly present.   Cardiovascular: Normal rate, regular rhythm, normal heart sounds and intact distal pulses. Exam reveals no gallop and no friction rub.   No murmur heard.  Pulmonary/Chest: Tachypnea noted. She is in respiratory distress.  She has no wheezes. She has no rhonchi. She has rales. She exhibits no tenderness.   Abdominal: Soft. Bowel sounds are normal. She exhibits no distension. There is no tenderness. There is no guarding.   Musculoskeletal: Normal range of motion. She exhibits no edema or tenderness.   Neurological: She is alert and oriented to person, place, and time. She displays normal reflexes. No cranial nerve deficit or sensory deficit. Coordination normal.   Skin: Skin is warm, dry and intact. Capillary refill takes less than 2 seconds. No abrasion, no bruising and no rash noted. No cyanosis. Nails show no clubbing.   Psychiatric: She has a normal mood and affect. Her behavior is normal. Judgment and thought content normal.       Significant Labs:   CBC:   Recent Labs   Lab 05/30/19 0524 05/31/19  0551 05/31/19  0551   WBC 0.54* 11.91  --    HGB 8.6* 10.7*  --    HCT 26.3* 34.6* 32*   PLT 44* 82*  --    , CMP:   Recent Labs   Lab 05/30/19 0524 05/31/19  0414    138   K 3.4* 3.3*    115*   CO2 22* 15*    88   BUN 12 18   CREATININE 0.9 1.3   CALCIUM 7.6* 5.2*   ANIONGAP 9 8   EGFRNONAA >60 46*   , Coagulation:   No results for input(s): PT, INR, APTT in the last 48 hours., Haptoglobin: No results for input(s): HAPTOGLOBIN in the last 48 hours., Immunology: No results for input(s): SPEP, PARMJIT, INDER, FREELAMBDALI in the last 48 hours., LDH: No results for input(s): LDHCSF, BFSOURCE in the last 48 hours. and LFTs:   No results for input(s): ALT, AST, ALKPHOS, BILITOT, PROT, ALBUMIN in the last 48 hours.    Diagnostic Results:  I have reviewed all pertinent imaging results/findings within the past 24 hours.    Assessment/Plan:     Pancytopenia  Pancytopenia secondary to hairy cell leukemia/sepsis:  --continue to monitor and transfuse irradiated products as needed    May 30, 2019  --Transfuse if platelets <10  --Transfuse if hemoglobin <8  --Daily CBC    Hairy cell leukemia  Hairy cell leukemia diagnosed greater than  5 years ago without previous treatment.  Suspect that she will need treatment in the near future and she plans to follow up with Dr. padron after discharge to discuss treatment.    May 31, 2019  --WBC 11.9 however, Granulocytes 0% on CBC. Will add Granix in hopes of helping bone marrow produce functioning WBCs. Monitor closely. Daily CBC    Severe pneumonia  Continue broad-spectrum antibiotics/supportive care.  Patient has severe neutropenia secondary to hairy cell leukemia which may slow recovery.  --continue to monitor closely  --supportive care/broad-spectrum antibiotic    May 30, 2019  --Patient with declining clinically  --Awaiting Pulmonology Recommendations  --Continue IV Vancomycin. Merrem added. Follow ID recs  --supplemental oxgyen  --Nebs PRN  --Continue supportive care    May 31, 2019  --Fever curve improved however respiratory status declining. Patient now intubated on pressors and IV sedation  --Follow Pulmonology/ID recs  --Supportive care        Thank you for your consult. I will follow-up with patient. Please contact us if you have any additional questions.     Phylicia Chung NP  Hematology/Oncology  [facility]

## 2019-05-31 NOTE — ASSESSMENT & PLAN NOTE
Continue broad-spectrum antibiotics/supportive care.  Patient has severe neutropenia secondary to hairy cell leukemia which may slow recovery.  --continue to monitor closely  --supportive care/broad-spectrum antibiotic    May 30, 2019  --Patient with declining clinically  --Awaiting Pulmonology Recommendations  --Continue IV Vancomycin. Merrem added. Follow ID recs  --supplemental oxgyen  --Nebs PRN  --Continue supportive care    May 31, 2019  --Fever curve improved however respiratory status declining. Patient now intubated on pressors and IV sedation  --Follow Pulmonology/ID recs  --Supportive care

## 2019-05-31 NOTE — PROGRESS NOTES
Pharmacokinetic Assessment Follow Up: IV Vancomycin    Vancomycin serum concentration assessment(s):    The trough level was drawned correctly and can be used to guide therapy at this time. The measurement is above the desired definitive target range of 15 to 20 mcg/mL.    Vancomycin Regimen Plan:    Change regimen to Vancomycin 1000 mg IV every 12hour with next serum trough concentration measured at 7AM prior to next dose  dose on 5/31/19     Pharmacy will continue to follow and monitor vancomycin.    Please contact pharmacy at extension [phone] for questions regarding this assessment.    Thank you for the consult,   Abby Mariano     Patient brief summary:  Alissa Sadler is a 58 y.o. female initiated on antimicrobial therapy with IV Vancomycin for treatment of suspected pneumonia     The patient received a loading dose, followed by the current treatment regimen: random levels with plan to redose when level is less than 20 mcg/mL    Drug Allergies:   Review of patient's allergies indicates:   Allergen Reactions    Adhesive Rash       Actual Body Weight:   74.5 kg    Renal Function:   Estimated Creatinine Clearance: 72.9 mL/min (based on SCr of 0.9 mg/dL).,     Dialysis Method (if applicable):  N/a     CBC (last 72 hours):  Recent Labs   Lab Result Units 05/28/19  0542 05/29/19 0310 05/30/19  0524   WBC K/uL 1.06* 0.71* 0.54*   Hemoglobin g/dL 8.8* 8.6* 8.6*   Hematocrit % 26.7* 26.0* 26.3*   Platelets K/uL 36* 37* 44*   Gran% % 19.8* 21.2* 9.2*   Lymph% % 68.9* 73.2* 70.4*   Mono% % 11.3 7.0 20.4*   Eosinophil% % 0.0 0.0 0.0   Basophil% % 0.0 1.4 0.0   Differential Method  Automated Automated Automated       Metabolic Panel (last 72 hours):  Recent Labs   Lab Result Units 05/28/19  0542 05/29/19 0310 05/29/19 2020 05/30/19  0524   Sodium mmol/L 137 138  --  138   Potassium mmol/L 3.5 3.4*  --  3.4*   Chloride mmol/L 106 106  --  107   CO2 mmol/L 25 22*  --  22*   Glucose mg/dL 154* 122*  --  100    BUN, Bld mg/dL 10 10  --  12   Creatinine mg/dL 0.8 0.8  --  0.9   Magnesium mg/dL  --   --  1.6  --    Phosphorus mg/dL  --   --  2.3*  --        Vancomycin Administrations:  vancomycin given in the last 96 hours                     vancomycin (VANCOCIN) 1,250 mg in dextrose 5 % 250 mL IVPB (mg) 1,250 mg New Bag 05/30/19 0701     1,250 mg New Bag 05/29/19 1934    vancomycin in dextrose 5 % 1 gram/250 mL IVPB 1,000 mg (mg) 1,000 mg New Bag 05/29/19 0434     1,000 mg New Bag 05/28/19 1724     1,000 mg New Bag  0443                      Drug levels (last 3 results):  Recent Labs   Lab Result Units 05/29/19  0310 05/30/19  1824   Vancomycin-Trough ug/mL 13.3 22.6*       Microbiologic Results:  Microbiology Results (last 7 days)       Procedure Component Value Units Date/Time    Culture, Respiratory with Gram Stain [[id number]] Collected:  05/30/19 1717    Order Status:  Sent Specimen:  Respiratory from Sputum, Induced Updated:  05/30/19 1718    Blood culture x two cultures. Draw prior to antibiotics. [[id number]] Collected:  05/27/19 1410    Order Status:  Completed Specimen:  Blood from Peripheral, Antecubital, Left Updated:  05/29/19 2212     Blood Culture, Routine No Growth to date     Blood Culture, Routine No Growth to date     Blood Culture, Routine No Growth to date    Narrative:       Aerobic and anaerobic    Blood culture x two cultures. Draw prior to antibiotics. [[id number]] Collected:  05/27/19 1420    Order Status:  Completed Specimen:  Blood from Peripheral, Wrist, Left Updated:  05/29/19 2212     Blood Culture, Routine No Growth to date     Blood Culture, Routine No Growth to date     Blood Culture, Routine No Growth to date    Narrative:       Aerobic and anaerobic

## 2019-05-31 NOTE — ASSESSMENT & PLAN NOTE
Vitals:    05/31/19 0900 05/31/19 0949 05/31/19 1105 05/31/19 1149   BP: (!) 63/11  (!) 42/25    Pulse: (!) 138  (!) 143 (!) 142   Resp: (!) 29  (!) 28 (!) 27   Temp:   100.2 °F (37.9 °C)    TempSrc:   Oral    SpO2:  (!) 88% (!) 85% (!) 84%   Weight:       Height:             ID consulted. Appreciate ID input.     [x] Respiratory rate >20 breaths/min or PaCO2 <32 mmHg   []  WBC >12,000 cells/mm3, <4000 cells/mm3, or >10 percent immature (band) forms  [x] Heart rate >90 beats/min   [] Temperature >38ºC or <36ºC    Microbiology: Panculture.   Oxygenation: Mechanical assisted ventilation. Keep SAO2 > = 92%  Hemodynamics: IV Norepinephrine. IV Vasopressin. IV Phenylephrine IV crystalloids. Keep MAP > = 65mmHg  Source control: IV Meropenem, IV Vancomycin, IV Voriconazole, IV Clindamycin.

## 2019-06-01 LAB
1,3 BETA GLUCAN SPEC-MCNC: <31 PG/ML
BACTERIA BLD CULT: NORMAL

## 2019-06-01 NOTE — PLAN OF CARE
19 1451   Final Note   Assessment Type Final Discharge Note   Anticipated Discharge Disposition

## 2019-06-03 LAB
BACTERIA BLD CULT: NORMAL
BACTERIA SPEC AEROBE CULT: NO GROWTH
GRAM STN SPEC: NORMAL

## 2020-05-11 NOTE — SUBJECTIVE & OBJECTIVE
Oncology Treatment Plan:   [No treatment plan]    Medications:  Continuous Infusions:   sodium chloride 0.9% 75 mL/hr at 05/27/19 2115     Scheduled Meds:   dextromethorphan-guaifenesin  mg  1 tablet Oral BID    piperacillin-tazobactam (ZOSYN) IVPB  4.5 g Intravenous Q8H    vancomycin (VANCOCIN) IVPB  1,000 mg Intravenous Q12H     PRN Meds:acetaminophen, albuterol-ipratropium, benzonatate, ondansetron, sodium chloride 0.9%     Review of patient's allergies indicates:   Allergen Reactions    Adhesive Rash        Past Medical History:   Diagnosis Date    Brain tumor     Hairy cell leukemia 2016     Past Surgical History:   Procedure Laterality Date    APPENDECTOMY      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 Negative for appetite change, chills and diaphoresis.   HENT: Negative for congestion, dental problem, drooling, ear discharge, facial swelling, mouth sores, nosebleeds, sinus pressure and sinus pain.    Eyes: Negative for photophobia, pain, discharge, itching and visual disturbance.   Respiratory: Positive for shortness of breath. Negative for apnea, cough, choking, chest tightness and stridor.    Cardiovascular: Positive for palpitations. Negative for chest pain and leg swelling.   Gastrointestinal: Negative for abdominal distention, abdominal pain, anal bleeding, blood in stool, constipation, diarrhea, nausea and vomiting.   Endocrine: Negative for cold intolerance, heat intolerance, polydipsia and polyphagia.   Genitourinary: Negative for difficulty urinating, dyspareunia, dysuria, flank pain,  Today's INR is 2.6.   Pt here today for recheck of subtherapeutic INR of unknown cause.  Pt denies med changes or bleeding issues.  Instructed pt on weekly coumadin dose.  Recheck INR next wk.  Patient instructed regarding medication; results given and questions answered. Nutritional counseling given.  Dietary factors affecting therapy addressed.  Patient instructed to monitor for excessive bruising or bleeding.  Findings reported by Nilam Madrigal RN.       This document has been electronically signed by CRISTINA Bergeron-BC @  On May 11, 2020 13:28       frequency, pelvic pain and vaginal bleeding.   Musculoskeletal: Negative for arthralgias, back pain, gait problem, joint swelling, myalgias and neck pain.   Skin: Negative for pallor, rash and wound.   Allergic/Immunologic: Negative for environmental allergies and immunocompromised state.   Neurological: Negative for dizziness, tremors, seizures, facial asymmetry, speech difficulty, light-headedness, numbness and headaches.   Hematological: Negative for adenopathy. Does not bruise/bleed easily.   Psychiatric/Behavioral: Negative for agitation, behavioral problems, confusion, dysphoric mood and hallucinations. The patient is not nervous/anxious and is not hyperactive.      Objective:     Vital Signs (Most Recent):  Temp: (!) 101.9 °F (38.8 °C) (05/28/19 1556)  Pulse: 104 (05/28/19 1556)  Resp: 16 (05/28/19 1556)  BP: (!) 104/56 (05/28/19 1556)  SpO2: 98 % (05/28/19 1556) Vital Signs (24h Range):  Temp:  [96.8 °F (36 °C)-101.9 °F (38.8 °C)] 101.9 °F (38.8 °C)  Pulse:  [] 104  Resp:  [16-20] 16  SpO2:  [89 %-98 %] 98 %  BP: ()/(50-66) 104/56     Weight: 74.5 kg (164 lb 3.9 oz)  Body mass index is 24.25 kg/m².  Body surface area is 1.9 meters squared.      Intake/Output Summary (Last 24 hours) at 5/28/2019 1603  Last data filed at 5/28/2019 1339  Gross per 24 hour   Intake 1846.25 ml   Output 950 ml   Net 896.25 ml       Physical Exam   Constitutional: She is oriented to person, place, and time. She appears well-developed and well-nourished. No distress.   Well groomed   HENT:   Head: Normocephalic and atraumatic.   Right Ear: External ear normal.   Left Ear: External ear normal.   Nose: Nose normal.   Mouth/Throat: Oropharynx is clear and moist. No oropharyngeal exudate.   Eyes: Pupils are equal, round, and reactive to light. Conjunctivae and EOM are normal. Right eye exhibits no discharge. Left eye exhibits no discharge.   Neck: Normal range of motion. Neck supple. No tracheal deviation present. No  thyromegaly present.   Cardiovascular: Normal rate, regular rhythm, normal heart sounds and intact distal pulses. Exam reveals no gallop and no friction rub.   No murmur heard.  Pulmonary/Chest: Effort normal and breath sounds normal. She has no wheezes. She has no rales. She exhibits no tenderness.   Abdominal: Soft. Bowel sounds are normal. She exhibits no distension. There is no tenderness. There is no guarding.   Musculoskeletal: Normal range of motion. She exhibits no edema or tenderness.   Neurological: She is alert and oriented to person, place, and time. She displays normal reflexes. No cranial nerve deficit or sensory deficit. Coordination normal.   Skin: Skin is warm, dry and intact. Capillary refill takes less than 2 seconds. No abrasion, no bruising and no rash noted. No cyanosis. Nails show no clubbing.   Psychiatric: She has a normal mood and affect. Her behavior is normal. Judgment and thought content normal.       Significant Labs:   CBC:   Recent Labs   Lab 05/27/19  1410 05/28/19  0542   WBC 1.15* 1.06*   HGB 10.7* 8.8*   HCT 33.0* 26.7*   PLT 42* 36*   , CMP:   Recent Labs   Lab 05/27/19  1410 05/28/19  0542    137   K 4.1 3.5    106   CO2 23 25    154*   BUN 14 10   CREATININE 0.9 0.8   CALCIUM 8.8 8.3*   PROT 6.3  --    ALBUMIN 3.0*  --    BILITOT 1.3*  --    ALKPHOS 70  --    AST 13  --    ALT 14  --    ANIONGAP 12 6*   EGFRNONAA >60 >60   , Coagulation:   Recent Labs   Lab 05/27/19  1410   INR 1.0   APTT 23.4   , Haptoglobin: No results for input(s): HAPTOGLOBIN in the last 48 hours., Immunology: No results for input(s): SPEP, PARMJIT, INDER, FREELAMBDALI in the last 48 hours., LDH: No results for input(s): LDHCSF, BFSOURCE in the last 48 hours. and LFTs:   Recent Labs   Lab 05/27/19  1410   ALT 14   AST 13   ALKPHOS 70   BILITOT 1.3*   PROT 6.3   ALBUMIN 3.0*       Diagnostic Results:  I have reviewed all pertinent imaging results/findings within the past 24 hours.